# Patient Record
Sex: MALE | Race: WHITE | NOT HISPANIC OR LATINO | Employment: OTHER | URBAN - METROPOLITAN AREA
[De-identification: names, ages, dates, MRNs, and addresses within clinical notes are randomized per-mention and may not be internally consistent; named-entity substitution may affect disease eponyms.]

---

## 2018-08-29 ENCOUNTER — OFFICE VISIT (OUTPATIENT)
Dept: FAMILY MEDICINE CLINIC | Facility: CLINIC | Age: 58
End: 2018-08-29

## 2018-08-29 VITALS
RESPIRATION RATE: 20 BRPM | TEMPERATURE: 97.9 F | HEART RATE: 92 BPM | BODY MASS INDEX: 29.26 KG/M2 | DIASTOLIC BLOOD PRESSURE: 70 MMHG | HEIGHT: 74 IN | WEIGHT: 228 LBS | SYSTOLIC BLOOD PRESSURE: 126 MMHG

## 2018-08-29 DIAGNOSIS — M79.671 PAIN OF RIGHT HEEL: Primary | ICD-10-CM

## 2018-08-29 DIAGNOSIS — M79.645 BILATERAL THUMB PAIN: ICD-10-CM

## 2018-08-29 DIAGNOSIS — M79.644 BILATERAL THUMB PAIN: ICD-10-CM

## 2018-08-29 PROCEDURE — 96372 THER/PROPH/DIAG INJ SC/IM: CPT | Performed by: NURSE PRACTITIONER

## 2018-08-29 PROCEDURE — 99202 OFFICE O/P NEW SF 15 MIN: CPT | Performed by: NURSE PRACTITIONER

## 2018-08-29 RX ORDER — ASPIRIN 81 MG/1
81 TABLET ORAL DAILY
COMMUNITY

## 2018-08-29 RX ORDER — DEXAMETHASONE SODIUM PHOSPHATE 4 MG/ML
4 INJECTION, SOLUTION INTRA-ARTICULAR; INTRALESIONAL; INTRAMUSCULAR; INTRAVENOUS; SOFT TISSUE ONCE
Status: COMPLETED | OUTPATIENT
Start: 2018-08-29 | End: 2018-08-29

## 2018-08-29 RX ORDER — ATORVASTATIN CALCIUM 10 MG/1
10 TABLET, FILM COATED ORAL DAILY
COMMUNITY
End: 2018-11-30

## 2018-08-29 RX ORDER — ESCITALOPRAM OXALATE 10 MG/1
10 TABLET ORAL DAILY
COMMUNITY
End: 2018-10-04 | Stop reason: DRUGHIGH

## 2018-08-29 RX ADMIN — DEXAMETHASONE SODIUM PHOSPHATE 4 MG: 4 INJECTION, SOLUTION INTRA-ARTICULAR; INTRALESIONAL; INTRAMUSCULAR; INTRAVENOUS; SOFT TISSUE at 18:40

## 2018-08-29 NOTE — PATIENT INSTRUCTIONS
Foot Pain  Many things can cause foot pain. Some common causes are:  · An injury.  · A sprain.  · Arthritis.  · Blisters.  · Bunions.    Follow these instructions at home:  Pay attention to any changes in your symptoms. Take these actions to help with your discomfort:  · If directed, put ice on the affected area:  ? Put ice in a plastic bag.  ? Place a towel between your skin and the bag.  ? Leave the ice on for 15-20 minutes, 3?4 times a day for 2 days.  · Take over-the-counter and prescription medicines only as told by your health care provider.  · Wear comfortable, supportive shoes that fit you well. Do not wear high heels.  · Do not stand or walk for long periods of time.  · Do not lift a lot of weight. This can put added pressure on your feet.  · Do stretches to relieve foot pain and stiffness as told by your health care provider.  · Rub your foot gently.  · Keep your feet clean and dry.    Contact a health care provider if:  · Your pain does not get better after a few days of self-care.  · Your pain gets worse.  · You cannot stand on your foot.  Get help right away if:  · Your foot is numb or tingling.  · Your foot or toes are swollen.  · Your foot or toes turn white or blue.  · You have warmth and redness along your foot.  This information is not intended to replace advice given to you by your health care provider. Make sure you discuss any questions you have with your health care provider.  Document Released: 01/13/2017 Document Revised: 05/25/2017 Document Reviewed: 01/13/2016  [employer] Interactive Patient Education © 2018 [employer]  Dexamethasone injection  What is this medicine?  DEXAMETHASONE (dex a METH a sone) is a corticosteroid. It is used to treat inflammation of the skin, joints, lungs, and other organs. Common conditions treated include asthma, allergies, and arthritis. It is also used for other conditions, like blood disorders and diseases of the adrenal glands.  This medicine may be used for  other purposes; ask your health care provider or pharmacist if you have questions.  COMMON BRAND NAME(S): Decadron, DoubleDex, Simplist Dexamethasone, Solurex  What should I tell my health care provider before I take this medicine?  They need to know if you have any of these conditions:  -blood clotting problems  -Cushing's syndrome  -diabetes  -glaucoma  -heart problems or disease  -high blood pressure  -infection like herpes, measles, tuberculosis, or chickenpox  -kidney disease  -liver disease  -mental problems  -myasthenia gravis  -osteoporosis  -previous heart attack  -seizures  -stomach, ulcer or intestine disease including colitis and diverticulitis  -thyroid problem  -an unusual or allergic reaction to dexamethasone, corticosteroids, other medicines, lactose, foods, dyes, or preservatives  -pregnant or trying to get pregnant  -breast-feeding  How should I use this medicine?  This medicine is for injection into a muscle, joint, lesion, soft tissue, or vein. It is given by a health care professional in a hospital or clinic setting.  Talk to your pediatrician regarding the use of this medicine in children. Special care may be needed.  Overdosage: If you think you have taken too much of this medicine contact a poison control center or emergency room at once.  NOTE: This medicine is only for you. Do not share this medicine with others.  What if I miss a dose?  This may not apply. If you are having a series of injections over a prolonged period, try not to miss an appointment. Call your doctor or health care professional to reschedule if you are unable to keep an appointment.  What may interact with this medicine?  Do not take this medicine with any of the following medications:  -mifepristone, RU-486  -vaccines  This medicine may also interact with the following medications:  -amphotericin B  -antibiotics like clarithromycin, erythromycin, and troleandomycin  -aspirin and aspirin-like drugs  -barbiturates like  phenobarbital  -carbamazepine  -cholestyramine  -cholinesterase inhibitors like donepezil, galantamine, rivastigmine, and tacrine  -cyclosporine  -digoxin  -diuretics  -ephedrine  -female hormones, like estrogens or progestins and birth control pills  -indinavir  -isoniazid  -ketoconazole  -medicines for diabetes  -medicines that improve muscle tone or strength for conditions like myasthenia gravis  -NSAIDs, medicines for pain and inflammation, like ibuprofen or naproxen  -phenytoin  -rifampin  -thalidomide  -warfarin  This list may not describe all possible interactions. Give your health care provider a list of all the medicines, herbs, non-prescription drugs, or dietary supplements you use. Also tell them if you smoke, drink alcohol, or use illegal drugs. Some items may interact with your medicine.  What should I watch for while using this medicine?  Your condition will be monitored carefully while you are receiving this medicine.  If you are taking this medicine for a long time, carry an identification card with your name and address, the type and dose of your medicine, and your doctor's name and address.  This medicine may increase your risk of getting an infection. Stay away from people who are sick. Tell your doctor or health care professional if you are around anyone with measles or chickenpox. Talk to your health care provider before you get any vaccines that you take this medicine.  If you are going to have surgery, tell your doctor or health care professional that you have taken this medicine within the last twelve months.  Ask your doctor or health care professional about your diet. You may need to lower the amount of salt you eat.  The medicine can increase your blood sugar. If you are a diabetic check with your doctor if you need help adjusting the dose of your diabetic medicine.  What side effects may I notice from receiving this medicine?  Side effects that you should report to your doctor or health  care professional as soon as possible:  -allergic reactions like skin rash, itching or hives, swelling of the face, lips, or tongue  -black or tarry stools  -change in the amount of urine  -changes in vision  -confusion, excitement, restlessness, a false sense of well-being  -fever, sore throat, sneezing, cough, or other signs of infection, wounds that will not heal  -hallucinations  -increased thirst  -mental depression, mood swings, mistaken feelings of self importance or of being mistreated  -pain in hips, back, ribs, arms, shoulders, or legs  -pain, redness, or irritation at the injection site  -redness, blistering, peeling or loosening of the skin, including inside the mouth  -rounding out of face  -swelling of feet or lower legs  -unusual bleeding or bruising  -unusual tired or weak  -wounds that do not heal  Side effects that usually do not require medical attention (report to your doctor or health care professional if they continue or are bothersome):  -diarrhea or constipation  -change in taste  -headache  -nausea, vomiting  -skin problems, acne, thin and shiny skin  -touble sleeping  -unusual growth of hair on the face or body  -weight gain  This list may not describe all possible side effects. Call your doctor for medical advice about side effects. You may report side effects to FDA at 2-433-FDA-9839.  Where should I keep my medicine?  This drug is given in a hospital or clinic and will not be stored at home.  NOTE: This sheet is a summary. It may not cover all possible information. If you have questions about this medicine, talk to your doctor, pharmacist, or health care provider.  © 2018 [employer] Standard (2009-04-09 14:04:12)    Hand Pain  Many things can cause hand pain. Some common causes are:  · An injury.  · Repeating the same movement with your hand over and over (overuse).  · Osteoporosis.  · Arthritis.  · Lumps in the tendons or joints of the hand and wrist (ganglion  cysts).  · Infection.    Follow these instructions at home:  Pay attention to any changes in your symptoms. Take these actions to help with your discomfort:  · If directed, put ice on the affected area:  ? Put ice in a plastic bag.  ? Place a towel between your skin and the bag.  ? Leave the ice on for 15-20 minutes, 3?4 times a day for 2 days.  · Take over-the-counter and prescription medicines only as told by your health care provider.  · Minimize stress on your hands and wrists as much as possible.  · Take breaks from repetitive activity often.  · Do stretches as told by your health care provider.  · Do not do activities that make your pain worse.    Contact a health care provider if:  · Your pain does not get better after a few days of self-care.  · Your pain gets worse.  · Your pain affects your ability to do your daily activities.  Get help right away if:  · Your hand becomes warm, red, or swollen.  · Your hand is numb or tingling.  · Your hand is extremely swollen or deformed.  · Your hand or fingers turn white or blue.  · You cannot move your hand, wrist, or fingers.  This information is not intended to replace advice given to you by your health care provider. Make sure you discuss any questions you have with your health care provider.  Document Released: 01/13/2017 Document Revised: 05/25/2017 Document Reviewed: 01/13/2016  [employer] Interactive Patient Education © 2018 [employer]  Diclofenac skin gel  What is this medicine?  DICLOFENAC (dye KLOE fen ak) is a non-steroidal anti-inflammatory drug (NSAID). The 1% skin gel is used to treat osteoarthritis of the hands or knees. The 3% skin gel is used to treat actinic keratosis.  This medicine may be used for other purposes; ask your health care provider or pharmacist if you have questions.  COMMON BRAND NAME(S): DSG Enoc, Solaraze, Voltaren Gel  What should I tell my health care provider before I take this medicine?  They need to know if you have any of these  conditions:  -asthma  -bleeding problems  -coronary artery bypass graft (CABG) surgery within the past 2 weeks  -heart disease  -high blood pressure  -if you frequently drink alcohol containing drinks  -kidney disease  -liver disease  -open or infected skin  -stomach problems  -an unusual or allergic reaction to diclofenac, aspirin, other NSAIDs, other medicines, benzyl alcohol (3% gel only), foods, dyes, or preservatives  -pregnant or trying to get pregnant  -breast-feeding  How should I use this medicine?  This medicine is for external use only. Follow the directions on the prescription label. Wash hands before and after use. Do not get this medicine in your eyes. If you do, rinse out with plenty of cool tap water. Use your doses at regular intervals. Do not use your medicine more often than directed.  A special MedGuide will be given to you by the pharmacist with each prescription and refill of the 1% gel. Be sure to read this information carefully each time.  Talk to your pediatrician regarding the use of this medicine in children. Special care may be needed. The 3% gel is not approved for use in children.  Overdosage: If you think you have taken too much of this medicine contact a poison control center or emergency room at once.  NOTE: This medicine is only for you. Do not share this medicine with others.  What if I miss a dose?  If you miss a dose, use it as soon as you can. If it is almost time for your next dose, use only that dose. Do not use double or extra doses.  What may interact with this medicine?  -aspirin  -NSAIDs, medicines for pain and inflammation, like ibuprofen or naproxen  Do not use any other skin products without telling your doctor or health care professional.  This list may not describe all possible interactions. Give your health care provider a list of all the medicines, herbs, non-prescription drugs, or dietary supplements you use. Also tell them if you smoke, drink alcohol, or use  illegal drugs. Some items may interact with your medicine.  What should I watch for while using this medicine?  Tell your doctor or healthcare professional if your symptoms do not start to get better or if they get worse. You will need to follow up with your health care provider to monitor your progress. You may need to be treated for up to 3 months if you are using the 3% gel, but the full effect may not occur until 1 month after stopping treatment. If you develop a severe skin reaction, contact your doctor or health care professional immediately.  This medicine can make you more sensitive to the sun. Keep out of the sun. If you cannot avoid being in the sun, wear protective clothing and use sunscreen. Do not use sun lamps or tanning beds/booths.  Do not take medicines such as ibuprofen and naproxen with this medicine. Side effects such as stomach upset, nausea, or ulcers may be more likely to occur. Many medicines available without a prescription should not be taken with this medicine.  This medicine does not prevent heart attack or stroke. In fact, this medicine may increase the chance of a heart attack or stroke. The chance may increase with longer use of this medicine and in people who have heart disease. If you take aspirin to prevent heart attack or stroke, talk with your doctor or health care professional.  This medicine can cause ulcers and bleeding in the stomach and intestines at any time during treatment. Do not smoke cigarettes or drink alcohol. These increase irritation to your stomach and can make it more susceptible to damage from this medicine. Ulcers and bleeding can happen without warning symptoms and can cause death.  You may get drowsy or dizzy. Do not drive, use machinery, or do anything that needs mental alertness until you know how this medicine affects you. Do not stand or sit up quickly, especially if you are an older patient. This reduces the risk of dizzy or fainting spells.  This  medicine can cause you to bleed more easily. Try to avoid damage to your teeth and gums when you brush or floss your teeth.  What side effects may I notice from receiving this medicine?  Side effects that you should report to your doctor or health care professional as soon as possible:  -allergic reactions like skin rash, itching or hives, swelling of the face, lips, or tongue  -black or bloody stools, blood in the urine or vomit  -blurred vision  -chest pain  -difficulty breathing or wheezing  -nausea or vomiting  -redness, blistering, peeling or loosening of the skin, including inside the mouth  -slurred speech or weakness on one side of the body  -trouble passing urine or change in the amount of urine  -unexplained weight gain or swelling  -unusually weak or tired  -yellowing of eyes or skin  Side effects that usually do not require medical attention (report to your doctor or health care professional if they continue or are bothersome):  -dizziness  -dry skin  -headache  -heartburn  -increased sensitivity to the sun  -stomach pain  -tingling at the application site  This list may not describe all possible side effects. Call your doctor for medical advice about side effects. You may report side effects to FDA at 7-730-FDA-5739.  Where should I keep my medicine?  Keep out of the reach of children.  Store the 1% gel at room temperature between 15 and 30 degrees C (59 and 86 degrees F). Store the 3% gel at room temperature between 20 and 25 degrees C (68 and 77 degrees F). Protect from light. Throw away any unused medicine after the expiration date.  NOTE: This sheet is a summary. It may not cover all possible information. If you have questions about this medicine, talk to your doctor, pharmacist, or health care provider.  © 2018 [employer] (2009-04-20 16:35:07)

## 2018-08-30 NOTE — PROGRESS NOTES
Subjective   Anselmo Calderón is a 58 y.o. male.   Mr. Calderón presents today with c/o bilateral thumb pain and right heel pain. He has recently relocated to [place] from [place]-he has an upcoming appointment with LINA Ruiz on 10/24/18 to establish care with a new PCP.    History of Present Illness    The following portions of the patient's history were reviewed and updated as appropriate: allergies, current medications, past family history, past medical history, past social history, past surgical history and problem list.     No Known Allergies     Current Outpatient Prescriptions:   •  aspirin 81 MG EC tablet, Take 81 mg by mouth Daily., Disp: , Rfl:   •  atorvastatin (LIPITOR) 10 MG tablet, Take 10 mg by mouth Daily., Disp: , Rfl:   •  escitalopram (LEXAPRO) 10 MG tablet, Take 10 mg by mouth Daily., Disp: , Rfl:   •  metFORMIN (GLUCOPHAGE) 850 MG tablet, Take 850 mg by mouth 2 (Two) Times a Day With Meals., Disp: , Rfl:   •  Multiple Vitamins-Minerals (MULTIVITAMIN ADULT PO), Take  by mouth., Disp: , Rfl:   •  SITagliptin (JANUVIA) 100 MG tablet, Take 100 mg by mouth Daily., Disp: , Rfl:   •  diclofenac (VOLTAREN) 1 % gel gel, Apply 4 g topically to the appropriate area as directed 3 (Three) Times a Day., Disp: 100 g, Rfl: 0  No current facility-administered medications for this visit.      Review of Systems   Constitutional: Negative for activity change, appetite change, chills, diaphoresis, fatigue, fever and unexpected weight change.   Respiratory: Negative.    Cardiovascular: Negative.    Gastrointestinal: Negative.    Musculoskeletal: Positive for arthralgias (bilateral thumb pain, right heel pain).        Patient states has a history of right ankle injury-has had five surgeries.   Skin: Negative.    Neurological: Negative.        Objective   Physical Exam   Constitutional: He is oriented to person, place, and time. Vital signs are normal. He appears well-developed and well-nourished. He is  cooperative. He does not appear ill. No distress.   HENT:   Mouth/Throat: Uvula is midline and mucous membranes are normal.   Neck: Normal range of motion. Neck supple. No thyromegaly present.   Cardiovascular: Normal rate, regular rhythm and normal heart sounds.    Pulmonary/Chest: Effort normal and breath sounds normal.   Musculoskeletal:        Right ankle: He exhibits decreased range of motion. Tenderness. Achilles tendon exhibits pain.        Left ankle: Normal.        Right hand: He exhibits tenderness (thumb). He exhibits normal range of motion and normal capillary refill.        Left hand: He exhibits tenderness (thumb). He exhibits normal range of motion and normal capillary refill.   Patient reports decreased grasping strength both hands.    Lymphadenopathy:     He has no cervical adenopathy.   Neurological: He is alert and oriented to person, place, and time.   Skin: Skin is warm, dry and intact. No rash noted. He is not diaphoretic.   Psychiatric: He has a normal mood and affect. His behavior is normal. Judgment and thought content normal.   Vitals reviewed.    Vitals:    08/29/18 1739   BP: 126/70   Pulse: 92   Resp: 20   Temp: 97.9 °F (36.6 °C)   Body mass index is 29.27 kg/m².     Assessment/Plan   Anselmo was seen today for foot pain and hand pain.    Diagnoses and all orders for this visit:    Pain of right heel  -     diclofenac (VOLTAREN) 1 % gel gel; Apply 4 g topically to the appropriate area as directed 3 (Three) Times a Day.  -     dexamethasone (DECADRON) injection 4 mg; Inject 1 mL into the appropriate muscle as directed by prescriber 1 (One) Time.  -     Ambulatory Referral to Podiatry    Bilateral thumb pain  -     diclofenac (VOLTAREN) 1 % gel gel; Apply 4 g topically to the appropriate area as directed 3 (Three) Times a Day.  -     dexamethasone (DECADRON) injection 4 mg; Inject 1 mL into the appropriate muscle as directed by prescriber 1 (One) Time.       Discussed the nature of the  medical condition(s) risks, complications, implications, management, safe and proper use of medications. Encouraged medication compliance, and keeping scheduled follow up appointments with me and any other providers.

## 2018-10-04 ENCOUNTER — OFFICE VISIT (OUTPATIENT)
Dept: FAMILY MEDICINE CLINIC | Facility: CLINIC | Age: 58
End: 2018-10-04

## 2018-10-04 VITALS
HEIGHT: 74 IN | OXYGEN SATURATION: 96 % | SYSTOLIC BLOOD PRESSURE: 148 MMHG | RESPIRATION RATE: 16 BRPM | DIASTOLIC BLOOD PRESSURE: 70 MMHG | TEMPERATURE: 96.7 F | WEIGHT: 237.2 LBS | BODY MASS INDEX: 30.44 KG/M2 | HEART RATE: 76 BPM

## 2018-10-04 DIAGNOSIS — Z23 IMMUNIZATION DUE: ICD-10-CM

## 2018-10-04 DIAGNOSIS — Z00.00 HEALTH CARE MAINTENANCE: ICD-10-CM

## 2018-10-04 DIAGNOSIS — Z11.59 NEED FOR HEPATITIS C SCREENING TEST: ICD-10-CM

## 2018-10-04 DIAGNOSIS — E78.5 HYPERLIPIDEMIA, UNSPECIFIED HYPERLIPIDEMIA TYPE: ICD-10-CM

## 2018-10-04 DIAGNOSIS — N20.0 RENAL STONE: ICD-10-CM

## 2018-10-04 DIAGNOSIS — E11.9 CONTROLLED TYPE 2 DIABETES MELLITUS WITHOUT COMPLICATION, WITHOUT LONG-TERM CURRENT USE OF INSULIN (HCC): ICD-10-CM

## 2018-10-04 DIAGNOSIS — I86.1 RIGHT VARICOCELE: ICD-10-CM

## 2018-10-04 DIAGNOSIS — K21.00 GASTROESOPHAGEAL REFLUX DISEASE WITH ESOPHAGITIS: ICD-10-CM

## 2018-10-04 DIAGNOSIS — M19.071 DEGENERATIVE JOINT DISEASE OF ANKLE AND FOOT, RIGHT: ICD-10-CM

## 2018-10-04 DIAGNOSIS — M70.70 BURSITIS OF HIP, UNSPECIFIED BURSA, UNSPECIFIED LATERALITY: ICD-10-CM

## 2018-10-04 DIAGNOSIS — F32.9 MAJOR DEPRESSIVE DISORDER WITH SINGLE EPISODE, REMISSION STATUS UNSPECIFIED: Primary | ICD-10-CM

## 2018-10-04 DIAGNOSIS — R20.2 PARESTHESIA: ICD-10-CM

## 2018-10-04 DIAGNOSIS — R06.83 SNORING: ICD-10-CM

## 2018-10-04 DIAGNOSIS — J34.2 DEVIATED NASAL SEPTUM: ICD-10-CM

## 2018-10-04 PROBLEM — I25.2 MYOCARDIAL INFARCT, OLD: Status: ACTIVE | Noted: 2018-10-04

## 2018-10-04 LAB
25(OH)D3 SERPL-MCNC: 28.6 NG/ML
ALBUMIN SERPL-MCNC: 4.69 G/DL (ref 3.2–4.8)
ALBUMIN/GLOB SERPL: 2.2 G/DL (ref 1.5–2.5)
ALP SERPL-CCNC: 61 U/L (ref 25–100)
ALT SERPL W P-5'-P-CCNC: 29 U/L (ref 7–40)
ANION GAP SERPL CALCULATED.3IONS-SCNC: 10 MMOL/L (ref 3–11)
ARTICHOKE IGE QN: 128 MG/DL (ref 0–130)
AST SERPL-CCNC: 22 U/L (ref 0–33)
BASOPHILS # BLD AUTO: 0.05 10*3/MM3 (ref 0–0.2)
BASOPHILS NFR BLD AUTO: 0.6 % (ref 0–1)
BILIRUB SERPL-MCNC: 0.6 MG/DL (ref 0.3–1.2)
BUN BLD-MCNC: 16 MG/DL (ref 9–23)
BUN/CREAT SERPL: 16.7 (ref 7–25)
CALCIUM SPEC-SCNC: 9.5 MG/DL (ref 8.7–10.4)
CHLORIDE SERPL-SCNC: 97 MMOL/L (ref 99–109)
CHOLEST SERPL-MCNC: 187 MG/DL (ref 0–200)
CO2 SERPL-SCNC: 29 MMOL/L (ref 20–31)
CREAT BLD-MCNC: 0.96 MG/DL (ref 0.6–1.3)
DEPRECATED RDW RBC AUTO: 44.3 FL (ref 37–54)
EOSINOPHIL # BLD AUTO: 0.14 10*3/MM3 (ref 0–0.3)
EOSINOPHIL NFR BLD AUTO: 1.6 % (ref 0–3)
ERYTHROCYTE [DISTWIDTH] IN BLOOD BY AUTOMATED COUNT: 13.3 % (ref 11.3–14.5)
GFR SERPL CREATININE-BSD FRML MDRD: 81 ML/MIN/1.73
GLOBULIN UR ELPH-MCNC: 2.1 GM/DL
GLUCOSE BLD-MCNC: 146 MG/DL (ref 70–100)
HAV IGM SERPL QL IA: NORMAL
HBV CORE IGM SERPL QL IA: NORMAL
HBV SURFACE AG SERPL QL IA: NORMAL
HCT VFR BLD AUTO: 46.9 % (ref 38.9–50.9)
HCV AB SER DONR QL: NORMAL
HDLC SERPL-MCNC: 50 MG/DL (ref 40–60)
HGB BLD-MCNC: 15.3 G/DL (ref 13.1–17.5)
IMM GRANULOCYTES # BLD: 0.09 10*3/MM3 (ref 0–0.03)
IMM GRANULOCYTES NFR BLD: 1 % (ref 0–0.6)
LYMPHOCYTES # BLD AUTO: 3.23 10*3/MM3 (ref 0.6–4.8)
LYMPHOCYTES NFR BLD AUTO: 36.7 % (ref 24–44)
MCH RBC QN AUTO: 29.8 PG (ref 27–31)
MCHC RBC AUTO-ENTMCNC: 32.6 G/DL (ref 32–36)
MCV RBC AUTO: 91.2 FL (ref 80–99)
MONOCYTES # BLD AUTO: 0.7 10*3/MM3 (ref 0–1)
MONOCYTES NFR BLD AUTO: 7.9 % (ref 0–12)
NEUTROPHILS # BLD AUTO: 4.69 10*3/MM3 (ref 1.5–8.3)
NEUTROPHILS NFR BLD AUTO: 53.2 % (ref 41–71)
PLATELET # BLD AUTO: 344 10*3/MM3 (ref 150–450)
PMV BLD AUTO: 10.4 FL (ref 6–12)
POC CREATININE URINE: 200
POC MICROALBUMIN URINE: 30
POTASSIUM BLD-SCNC: 4.5 MMOL/L (ref 3.5–5.5)
PROT SERPL-MCNC: 6.8 G/DL (ref 5.7–8.2)
RBC # BLD AUTO: 5.14 10*6/MM3 (ref 4.2–5.76)
SODIUM BLD-SCNC: 136 MMOL/L (ref 132–146)
T4 FREE SERPL-MCNC: 1.22 NG/DL (ref 0.89–1.76)
TRIGL SERPL-MCNC: 165 MG/DL (ref 0–150)
TSH SERPL DL<=0.05 MIU/L-ACNC: 0.89 MIU/ML (ref 0.35–5.35)
WBC NRBC COR # BLD: 8.81 10*3/MM3 (ref 3.5–10.8)

## 2018-10-04 PROCEDURE — 85025 COMPLETE CBC W/AUTO DIFF WBC: CPT | Performed by: NURSE PRACTITIONER

## 2018-10-04 PROCEDURE — 80061 LIPID PANEL: CPT | Performed by: NURSE PRACTITIONER

## 2018-10-04 PROCEDURE — 80074 ACUTE HEPATITIS PANEL: CPT | Performed by: NURSE PRACTITIONER

## 2018-10-04 PROCEDURE — 99214 OFFICE O/P EST MOD 30 MIN: CPT | Performed by: NURSE PRACTITIONER

## 2018-10-04 PROCEDURE — 36415 COLL VENOUS BLD VENIPUNCTURE: CPT | Performed by: NURSE PRACTITIONER

## 2018-10-04 PROCEDURE — 82306 VITAMIN D 25 HYDROXY: CPT | Performed by: NURSE PRACTITIONER

## 2018-10-04 PROCEDURE — 90686 IIV4 VACC NO PRSV 0.5 ML IM: CPT | Performed by: NURSE PRACTITIONER

## 2018-10-04 PROCEDURE — 90472 IMMUNIZATION ADMIN EACH ADD: CPT | Performed by: NURSE PRACTITIONER

## 2018-10-04 PROCEDURE — 84439 ASSAY OF FREE THYROXINE: CPT | Performed by: NURSE PRACTITIONER

## 2018-10-04 PROCEDURE — 82044 UR ALBUMIN SEMIQUANTITATIVE: CPT | Performed by: NURSE PRACTITIONER

## 2018-10-04 PROCEDURE — 90471 IMMUNIZATION ADMIN: CPT | Performed by: NURSE PRACTITIONER

## 2018-10-04 PROCEDURE — 90732 PPSV23 VACC 2 YRS+ SUBQ/IM: CPT | Performed by: NURSE PRACTITIONER

## 2018-10-04 PROCEDURE — 84443 ASSAY THYROID STIM HORMONE: CPT | Performed by: NURSE PRACTITIONER

## 2018-10-04 PROCEDURE — 80053 COMPREHEN METABOLIC PANEL: CPT | Performed by: NURSE PRACTITIONER

## 2018-10-04 PROCEDURE — 83036 HEMOGLOBIN GLYCOSYLATED A1C: CPT | Performed by: NURSE PRACTITIONER

## 2018-10-04 RX ORDER — TADALAFIL 5 MG/1
5 TABLET ORAL DAILY PRN
COMMUNITY

## 2018-10-04 RX ORDER — ESCITALOPRAM OXALATE 20 MG/1
20 TABLET ORAL DAILY
Qty: 90 TABLET | Refills: 1 | Status: SHIPPED | OUTPATIENT
Start: 2018-10-04 | End: 2019-03-19 | Stop reason: SDUPTHER

## 2018-10-04 NOTE — PROGRESS NOTES
Subjective   Anselmo Calderón is a 58 y.o. male.     History of Present Illness Here to establish care. Vague with some information of his history. Moved frequently has a child. Considers [place] his home. Spend some time in [place] but would not say why he was there. Has [employer] ins and wants to est care here. Living with his aunt and uncle. Retired .    Has multiple medical problems.  1. On lexapro for depression. Has fatigue and lack of motivation. On Lexapro for 18 months. It helps. Irritability seems to be a problem. No suicidal thoughts. Does not like to treat problems with medications. Is interested in therapy.  2. Has ED. Takes Cialis prn. Currently not sexually active.  3. Has had multiple hernia surgs. Now complaining of pain in right testicle. Has a varicocele. Requests referral to urology for surgical repair.  4. Snoring lately. This is new issue. Gasping for breath upon awakening. Has deviated septum. Would like referral to ENT for evaluation and then possible referral to sleep med.  5. Diagnosed with bursitis in left hip. Injection was recommended but he declined.  6. Long history of numbness right thigh. Nerve test-EMG. Did not get results due to lack of follow up.   7. Has a history of GERD and schiatizki's ring. Also had colonoscopy.      The following portions of the patient's history were reviewed and updated as appropriate: allergies, current medications, past family history, past medical history, past social history, past surgical history and problem list.    Review of Systems    Objective   Physical Exam   Constitutional: He is oriented to person, place, and time. He appears well-developed and well-nourished. No distress.   HENT:   Head: Normocephalic and atraumatic.   Right Ear: Tympanic membrane and external ear normal.   Left Ear: Tympanic membrane and external ear normal.   Nose: Nose normal.   Mouth/Throat: Oropharynx is clear and moist. No oropharyngeal exudate.   Eyes:  Pupils are equal, round, and reactive to light. Conjunctivae are normal. Right eye exhibits no discharge. Left eye exhibits no discharge. No scleral icterus.   Neck: Neck supple. No tracheal deviation present. No thyromegaly present.   Cardiovascular: Normal rate, regular rhythm and normal heart sounds.  Exam reveals no gallop and no friction rub.    No murmur heard.  Pulmonary/Chest: Effort normal and breath sounds normal. No respiratory distress. He has no wheezes.   Abdominal: Soft. Bowel sounds are normal. He exhibits no distension and no mass. There is no tenderness.   Musculoskeletal: He exhibits no edema or deformity.   Boot RLE   Lymphadenopathy:     He has no cervical adenopathy.   Neurological: He is alert and oriented to person, place, and time. Coordination normal.   Skin: Skin is warm and dry. Capillary refill takes less than 2 seconds. No rash noted. No erythema.   Psychiatric: He has a normal mood and affect. His speech is normal and behavior is normal. Judgment and thought content normal.   Nursing note and vitals reviewed.        Assessment/Plan   Anselmo was seen today for establish care, hyperlipidemia and diabetes.    Diagnoses and all orders for this visit:    Major depressive disorder with single episode, remission status unspecified  -     escitalopram (LEXAPRO) 20 MG tablet; Take 1 tablet by mouth Daily.  -     Ambulatory Referral to Psychology    Right varicocele  -     Ambulatory Referral to Urology    Snoring  -     Ambulatory Referral to ENT (Otolaryngology)    Deviated nasal septum  -     Ambulatory Referral to ENT (Otolaryngology)    Hyperlipidemia, unspecified hyperlipidemia type  -     Lipid Panel    Controlled type 2 diabetes mellitus without complication, without long-term current use of insulin (CMS/[facility])  -     POC Glycosylated Hemoglobin (Hb A1C)  -     POC Microalbumin  -     CBC & Differential  -     Comprehensive Metabolic Panel  -     T4, Free  -     TSH  -     CBC Auto  Differential    Degenerative joint disease of ankle and foot, right    Renal stone    Need for hepatitis C screening test  -     Hepatitis Panel, Acute    Health care maintenance  -     Vitamin D 25 Hydroxy    Immunization due  -     Fluarix/Fluzone/Afluria Quad>6 Months  -     Pneumococcal Polysaccharide Vaccine 23-Valent Greater Than or Equal To 1yo Subcutaneous / IM    Gastroesophageal reflux disease with esophagitis    Bursitis of hip, unspecified bursa, unspecified laterality    Paresthesia      Discuss extended office hours and appropriate use of the ER. Discussed no controlled substances prescribed from this office. Appropriate referrals will be made to pain management and psychiatry if needed. Stressed the importance and expectation of medical compliance with plan of care, medications, and follow up appointments.  Discussed we will address his problems 3 at a time.  Schedule CPX.  Fasting labs today.  Obtain previous medical records.  Appropriate referrals made.  Discussed the nature of the disease including, risks, complications, implications, management, safe and proper use of medications. Encouraged therapeutic lifestyle changes including low calorie diet with plenty of fruits and vegetables, daily exercise, medication compliance, and keeping scheduled follow up appointments with me and any other providers. Encouraged patient to have appointment for complete physical, fasting labs, appropriate screenings, and immunizations on an annual basis.  VIS provided.

## 2018-10-05 DIAGNOSIS — E11.9 CONTROLLED TYPE 2 DIABETES MELLITUS WITHOUT COMPLICATION, WITHOUT LONG-TERM CURRENT USE OF INSULIN (HCC): Primary | ICD-10-CM

## 2018-10-05 LAB — HBA1C MFR BLD: 7.6 % (ref 4.8–5.6)

## 2018-11-27 ENCOUNTER — HOSPITAL ENCOUNTER (OUTPATIENT)
Dept: GENERAL RADIOLOGY | Facility: HOSPITAL | Age: 58
Discharge: HOME | End: 2018-11-27
Admitting: NURSE PRACTITIONER

## 2018-11-27 ENCOUNTER — OFFICE VISIT (OUTPATIENT)
Dept: FAMILY MEDICINE CLINIC | Facility: CLINIC | Age: 58
End: 2018-11-27

## 2018-11-27 VITALS
BODY MASS INDEX: 30.94 KG/M2 | DIASTOLIC BLOOD PRESSURE: 78 MMHG | HEIGHT: 74 IN | RESPIRATION RATE: 18 BRPM | SYSTOLIC BLOOD PRESSURE: 126 MMHG | OXYGEN SATURATION: 95 % | HEART RATE: 84 BPM | TEMPERATURE: 97.3 F | WEIGHT: 241.13 LBS

## 2018-11-27 DIAGNOSIS — I25.2 MYOCARDIAL INFARCT, OLD: ICD-10-CM

## 2018-11-27 DIAGNOSIS — E11.9 CONTROLLED TYPE 2 DIABETES MELLITUS WITHOUT COMPLICATION, WITHOUT LONG-TERM CURRENT USE OF INSULIN (HCC): ICD-10-CM

## 2018-11-27 DIAGNOSIS — Z01.811 PRE-OP CHEST EXAM: ICD-10-CM

## 2018-11-27 DIAGNOSIS — R94.31 ABNORMAL EKG: ICD-10-CM

## 2018-11-27 DIAGNOSIS — Z01.818 PRE-OP EXAM: Primary | ICD-10-CM

## 2018-11-27 LAB
ALBUMIN SERPL-MCNC: 4.53 G/DL (ref 3.2–4.8)
ALBUMIN/GLOB SERPL: 2.2 G/DL (ref 1.5–2.5)
ALP SERPL-CCNC: 63 U/L (ref 25–100)
ALT SERPL W P-5'-P-CCNC: 25 U/L (ref 7–40)
ANION GAP SERPL CALCULATED.3IONS-SCNC: 6 MMOL/L (ref 3–11)
AST SERPL-CCNC: 20 U/L (ref 0–33)
BASOPHILS # BLD AUTO: 0.03 10*3/MM3 (ref 0–0.2)
BASOPHILS NFR BLD AUTO: 0.3 % (ref 0–1)
BILIRUB SERPL-MCNC: 0.4 MG/DL (ref 0.3–1.2)
BUN BLD-MCNC: 21 MG/DL (ref 9–23)
BUN/CREAT SERPL: 20.6 (ref 7–25)
CALCIUM SPEC-SCNC: 9.1 MG/DL (ref 8.7–10.4)
CHLORIDE SERPL-SCNC: 103 MMOL/L (ref 99–109)
CO2 SERPL-SCNC: 26 MMOL/L (ref 20–31)
CREAT BLD-MCNC: 1.02 MG/DL (ref 0.6–1.3)
DEPRECATED RDW RBC AUTO: 46.4 FL (ref 37–54)
EOSINOPHIL # BLD AUTO: 0.19 10*3/MM3 (ref 0–0.3)
EOSINOPHIL NFR BLD AUTO: 1.9 % (ref 0–3)
ERYTHROCYTE [DISTWIDTH] IN BLOOD BY AUTOMATED COUNT: 13.5 % (ref 11.3–14.5)
GFR SERPL CREATININE-BSD FRML MDRD: 75 ML/MIN/1.73
GLOBULIN UR ELPH-MCNC: 2.1 GM/DL
GLUCOSE BLD-MCNC: 184 MG/DL (ref 70–100)
HCT VFR BLD AUTO: 46.2 % (ref 38.9–50.9)
HGB BLD-MCNC: 14.8 G/DL (ref 13.1–17.5)
IMM GRANULOCYTES # BLD: 0.11 10*3/MM3 (ref 0–0.03)
IMM GRANULOCYTES NFR BLD: 1.1 % (ref 0–0.6)
LYMPHOCYTES # BLD AUTO: 3.46 10*3/MM3 (ref 0.6–4.8)
LYMPHOCYTES NFR BLD AUTO: 35 % (ref 24–44)
MCH RBC QN AUTO: 30 PG (ref 27–31)
MCHC RBC AUTO-ENTMCNC: 32 G/DL (ref 32–36)
MCV RBC AUTO: 93.5 FL (ref 80–99)
MONOCYTES # BLD AUTO: 1.04 10*3/MM3 (ref 0–1)
MONOCYTES NFR BLD AUTO: 10.5 % (ref 0–12)
NEUTROPHILS # BLD AUTO: 5.17 10*3/MM3 (ref 1.5–8.3)
NEUTROPHILS NFR BLD AUTO: 52.3 % (ref 41–71)
PLATELET # BLD AUTO: 369 10*3/MM3 (ref 150–450)
PMV BLD AUTO: 10.4 FL (ref 6–12)
POTASSIUM BLD-SCNC: 4.5 MMOL/L (ref 3.5–5.5)
PROT SERPL-MCNC: 6.6 G/DL (ref 5.7–8.2)
RBC # BLD AUTO: 4.94 10*6/MM3 (ref 4.2–5.76)
SODIUM BLD-SCNC: 135 MMOL/L (ref 132–146)
WBC NRBC COR # BLD: 9.89 10*3/MM3 (ref 3.5–10.8)

## 2018-11-27 PROCEDURE — 71046 X-RAY EXAM CHEST 2 VIEWS: CPT

## 2018-11-27 PROCEDURE — 80053 COMPREHEN METABOLIC PANEL: CPT | Performed by: NURSE PRACTITIONER

## 2018-11-27 PROCEDURE — 85025 COMPLETE CBC W/AUTO DIFF WBC: CPT | Performed by: NURSE PRACTITIONER

## 2018-11-27 PROCEDURE — 36415 COLL VENOUS BLD VENIPUNCTURE: CPT | Performed by: NURSE PRACTITIONER

## 2018-11-27 PROCEDURE — 99214 OFFICE O/P EST MOD 30 MIN: CPT | Performed by: NURSE PRACTITIONER

## 2018-11-27 NOTE — PROGRESS NOTES
Subjective   Anselmo Calderón is a 58 y.o. male who presents to the office today for a preoperative consultation at the request of surgeon Dr Vaz who plans on performing plasma injection right foot for plantar fascitis on December 6. This consultation is requested for the specific conditions prompting preoperative evaluation (i.e. because of potential affect on operative risk): due to history. Planned anesthesia: IV sedation. The patient has the following known anesthesia issues: patient is unsure if he has ever been intubated. Thinks he only had spinals for ankle surgery and hernia repair.. Patients bleeding risk: on Asa 81. Patient does not have objections to receiving blood products if needed.  He  has a past medical history of Degenerative joint disease of ankle and foot, right, Depression, Diabetes (CMS/[facility]), Hyperlipidemia, Lyme disease (2007), Myocardial infarct, old (10/4/2018), and Renal stone.  He does not have any pertinent problems on file.  He  has a past surgical history that includes Ankle surgery (Right); Inguinal hernia repair (Bilateral); Mouth surgery; and Tonsillectomy.  His family history includes Alcohol abuse in his maternal grandmother; Aneurysm in his paternal grandfather; Brain cancer in his paternal grandmother; Depression in his brother, sister, and son; Diabetes in his sister; Lung cancer in his maternal grandfather; No Known Problems in his daughter, father, and mother; Suicidality in his paternal grandmother.  He  reports that  has never smoked. he has never used smokeless tobacco. He reports that he drinks alcohol. He reports that he does not use drugs.  Current Outpatient Medications   Medication Sig Dispense Refill   • aspirin 81 MG EC tablet Take 81 mg by mouth Daily.     • atorvastatin (LIPITOR) 10 MG tablet Take 10 mg by mouth Daily.     • diclofenac (VOLTAREN) 1 % gel gel Apply 4 g topically to the appropriate area as directed 3 (Three) Times a Day. 100 g 0   • escitalopram  "(LEXAPRO) 20 MG tablet Take 1 tablet by mouth Daily. 90 tablet 1   • metFORMIN (GLUCOPHAGE) 850 MG tablet Take 850 mg by mouth 2 (Two) Times a Day With Meals.     • Multiple Vitamins-Minerals (MULTIVITAMIN ADULT PO) Take  by mouth.     • SITagliptin (JANUVIA) 100 MG tablet Take 100 mg by mouth Daily.     • tadalafil (CIALIS) 5 MG tablet Take 5 mg by mouth Daily As Needed for erectile dysfunction.       No current facility-administered medications for this visit.      Current Outpatient Medications on File Prior to Visit   Medication Sig   • aspirin 81 MG EC tablet Take 81 mg by mouth Daily.   • atorvastatin (LIPITOR) 10 MG tablet Take 10 mg by mouth Daily.   • diclofenac (VOLTAREN) 1 % gel gel Apply 4 g topically to the appropriate area as directed 3 (Three) Times a Day.   • escitalopram (LEXAPRO) 20 MG tablet Take 1 tablet by mouth Daily.   • metFORMIN (GLUCOPHAGE) 850 MG tablet Take 850 mg by mouth 2 (Two) Times a Day With Meals.   • Multiple Vitamins-Minerals (MULTIVITAMIN ADULT PO) Take  by mouth.   • SITagliptin (JANUVIA) 100 MG tablet Take 100 mg by mouth Daily.   • tadalafil (CIALIS) 5 MG tablet Take 5 mg by mouth Daily As Needed for erectile dysfunction.     No current facility-administered medications on file prior to visit.      He has No Known Allergies.    The following portions of the patient's history were reviewed and updated as appropriate: allergies, current medications, past family history, past medical history, past social history, past surgical history and problem list.    Review of Systems  A comprehensive review of systems was negative.    Objective   /78 (BP Location: Right arm, Patient Position: Sitting)   Pulse 84   Temp 97.3 °F (36.3 °C) (Temporal)   Resp 18   Ht 188 cm (74\")   Wt 109 kg (241 lb 2 oz)   SpO2 95%   BMI 30.96 kg/m²     General Appearance:    Alert, cooperative, no distress, appears stated age   Head:    Normocephalic, without obvious abnormality, atraumatic "   Eyes:    PERRL, conjunctiva/corneas clear, EOM's intact, fundi     benign, both eyes        Ears:    Normal TM's and external ear canals, both ears   Nose:   Nares normal, septum midline, mucosa normal, no drainage    or sinus tenderness   Throat:   Lips, mucosa, and tongue normal; teeth and gums normal   Neck:   Supple, symmetrical, trachea midline, no adenopathy;        thyroid:  No enlargement/tenderness/nodules; no carotid    bruit or JVD   Back:     Symmetric, no curvature, ROM normal, no CVA tenderness   Lungs:     Clear to auscultation bilaterally, respirations unlabored   Chest wall:    No tenderness or deformity   Heart:    Regular rate and rhythm, S1 and S2 normal, no murmur, rub   or gallop   Abdomen:     Soft, non-tender, bowel sounds active all four quadrants,     no masses, no organomegaly   Genitalia:    Normal male without lesion, discharge or tenderness   Rectal:    Normal tone, normal prostate, no masses or tenderness;    guaiac negative stool   Extremities:   Extremities normal, atraumatic, no cyanosis or edema   Pulses:   2+ and symmetric all extremities   Skin:   Skin color, texture, turgor normal, no rashes or lesions   Lymph nodes:   Cervical, supraclavicular, and axillary nodes normal   Neurologic:   CNII-XII intact. Normal strength, sensation and reflexes       throughout       Predictors of intubation difficulty:   Morbid obesity? no   Anatomically abnormal facies? no   Prominent incisors? no   Receding mandible? no   Short, thick neck? no   Neck range of motion: normal   Mallampati score: II (hard and soft palate, upper portion of tonsils anduvula visible)     Dentition: one tooth missing on left upper. Has a post    Cardiographics  ECG: rhythm: reg with 1st degree AV block, rate=72 bpm, jw=745 ms, voe=630 ms, da=759 ms, axis=0 degrees    Imaging  Chest x-ray: atelectasis bilaterally     Lab Review   Orders Only on 10/05/2018   Component Date Value   • Hemoglobin A1C 10/04/2018 7.60*    Office Visit on 10/04/2018   Component Date Value   • Microalbumin, Urine 10/04/2018 30    • Creatinine, Urine 10/04/2018 200    • Glucose 10/04/2018 146*   • BUN 10/04/2018 16    • Creatinine 10/04/2018 0.96    • Sodium 10/04/2018 136    • Potassium 10/04/2018 4.5    • Chloride 10/04/2018 97*   • CO2 10/04/2018 29.0    • Calcium 10/04/2018 9.5    • Total Protein 10/04/2018 6.8    • Albumin 10/04/2018 4.69    • ALT (SGPT) 10/04/2018 29    • AST (SGOT) 10/04/2018 22    • Alkaline Phosphatase 10/04/2018 61    • Total Bilirubin 10/04/2018 0.6    • eGFR Non  Amer 10/04/2018 81    • Globulin 10/04/2018 2.1    • A/G Ratio 10/04/2018 2.2    • BUN/Creatinine Ratio 10/04/2018 16.7    • Anion Gap 10/04/2018 10.0    • Total Cholesterol 10/04/2018 187    • Triglycerides 10/04/2018 165*   • HDL Cholesterol 10/04/2018 50    • LDL Cholesterol  10/04/2018 128    • Free T4 10/04/2018 1.22    • TSH 10/04/2018 0.888    • 25 Hydroxy, Vitamin D 10/04/2018 28.6    • Hepatitis B Surface Ag 10/04/2018 Non-Reactive    • Hep A IgM 10/04/2018 Non-Reactive    • Hep B C IgM 10/04/2018 Non-Reactive    • Hepatitis C Ab 10/04/2018 Non-Reactive    • WBC 10/04/2018 8.81    • RBC 10/04/2018 5.14    • Hemoglobin 10/04/2018 15.3    • Hematocrit 10/04/2018 46.9    • MCV 10/04/2018 91.2    • MCH 10/04/2018 29.8    • MCHC 10/04/2018 32.6    • RDW 10/04/2018 13.3    • RDW-SD 10/04/2018 44.3    • MPV 10/04/2018 10.4    • Platelets 10/04/2018 344    • Neutrophil % 10/04/2018 53.2    • Lymphocyte % 10/04/2018 36.7    • Monocyte % 10/04/2018 7.9    • Eosinophil % 10/04/2018 1.6    • Basophil % 10/04/2018 0.6    • Immature Grans % 10/04/2018 1.0*   • Neutrophils, Absolute 10/04/2018 4.69    • Lymphocytes, Absolute 10/04/2018 3.23    • Monocytes, Absolute 10/04/2018 0.70    • Eosinophils, Absolute 10/04/2018 0.14    • Basophils, Absolute 10/04/2018 0.05    • Immature Grans, Absolute 10/04/2018 0.09*       Anselmo was seen today for pre-op  exam.    Diagnoses and all orders for this visit:    Pre-op exam  -     CBC & Differential  -     Comprehensive Metabolic Panel  -     CBC Auto Differential  -     Ambulatory Referral to Cardiology    Pre-op chest exam  -     XR Chest PA & Lateral; Future  -     CBC & Differential  -     Comprehensive Metabolic Panel  -     CBC Auto Differential    Abnormal EKG  -     Ambulatory Referral to Cardiology    Myocardial infarct, old  -     Ambulatory Referral to Cardiology    Controlled type 2 diabetes mellitus without complication, without long-term current use of insulin (CMS/[facility])  -     Ambulatory Referral to Cardiology        Difficulty with intubation is not anticipated.    Cardiac Risk Estimation: will need Cardiology clearance due to history of MI and intraventricular conduction delay on EKG without comparison.    Patient seen by Cardiology on 11/30/18 and cleared for low risk foot surgery. See attached notes.    Current medications which may produce withdrawal symptoms if withheld perioperatively: none    1. Preoperative workup as follows chest x-ray, ECG, hemoglobin, hematocrit, electrolytes, creatinine, glucose, liver function studies.  2. Change in medication regimen before surgery hold ASA as directed by surgeon. Take other as usual with sip of water  3. Prophylaxis for cardiac events with perioperative beta-blockers: not indicated.  4. Invasive hemodynamic monitoring perioperatively: at the discretion of anesthesiologist.  5. Deep vein thrombosis prophylaxis postoperatively:regimen to be chosen by surgical team.  6. Surveillance for postoperative MI with ECG immediately postoperatively and on postoperative days 1 and 2 AND troponin levels 24 hours postoperatively and on day 4 or hospital discharge (whichever comes first): not indicated.

## 2018-11-29 PROBLEM — E66.9 CLASS 1 OBESITY IN ADULT: Status: ACTIVE | Noted: 2018-11-29

## 2018-11-29 PROBLEM — R94.31 ABNORMAL EKG: Status: ACTIVE | Noted: 2018-11-29

## 2018-11-29 PROCEDURE — 93000 ELECTROCARDIOGRAM COMPLETE: CPT | Performed by: INTERNAL MEDICINE

## 2018-11-29 NOTE — PROGRESS NOTES
"      Subjective   Anselmo Calderón is a 58 y.o. male.  Shilpi Dhaliwal, DNP, APRN  Shilpi Dhaliwal DNP, APRN  [address]  [place] [address]      Chief Complaint   Patient presents with   • Abnormal ECG   • Surgical Clearance       Patient Active Problem List    Diagnosis Date Noted   • Chest pain 11/30/2018     Priority: High   • Myocardial infarct, old 10/04/2018     Priority: High     Note Last Updated: 11/30/2018     Probable type 2 at age 38. Attributed to stress     • Tobacco chew use 11/30/2018     Priority: Low   • Class 1 obesity in adult 11/29/2018     Priority: Low   • Diabetes (CMS/[facility])      Priority: Low   • Hyperlipidemia LDL goal <70    • Depression    • Degenerative joint disease of ankle and foot, right    • Renal stone       History of Present Illness   This is a 58-year-old dyslipidemic diabetic male with a history of MI at age 38 which was attributed to stress.  He states he was told he had a myocardial infarction that \"barely met criteria\" .  He was evaluated at [facility], did not have cardiac catheterization.  He states he wore a monitor for a few days to monitor PVCs and was started on Klonopin.  He had no further testing.  He was recently evaluated at primary care for preprocedure clearance.  During that exam he is found to have an abnormal EKG and referred or service for further evaluation.  He does not have specific exercise regimen but does stay busy working on the [employer].  He has noticed recent random episodes of dyspnea which are brief.  He has a separate complaint of left sided chest discomfort which she calls \"cramp\".  This is not related to exertion, last one to 2 minutes, is 3-4/10 in intensity and not associated shortness of breath nausea diaphoresis or radiating pain.  He has been on his current dose of atorvastatin for 4-5 years.  He admits to using oral tobacco but signed up for a cessation program just yesterday.  He has no " complaint of orthopnea, PND, claudication, lower extremity edema.  He denies awareness of tachycardia arrhythmias, dizziness or syncope.    Past Surgical History:   Procedure Laterality Date   • ANKLE SURGERY Right     5 times. Initial trauma and subsequent complications from surg.   • INGUINAL HERNIA REPAIR Bilateral     multiple with mesh   • MOUTH SURGERY     • TONSILLECTOMY         The following portions of the patient's history were reviewed and updated as appropriate: allergies, current medications, past family history, past medical history, past social history, past surgical history and problem list.    No Known Allergies    Current Outpatient Medications:   •  aspirin 81 MG EC tablet, Take 81 mg by mouth Daily., Disp: , Rfl:   •  atorvastatin (LIPITOR) 10 MG tablet, Take 10 mg by mouth Daily., Disp: , Rfl:   •  diclofenac (VOLTAREN) 1 % gel gel, Apply 4 g topically to the appropriate area as directed 3 (Three) Times a Day., Disp: 100 g, Rfl: 0  •  escitalopram (LEXAPRO) 20 MG tablet, Take 1 tablet by mouth Daily., Disp: 90 tablet, Rfl: 1  •  metFORMIN (GLUCOPHAGE) 850 MG tablet, Take 850 mg by mouth 2 (Two) Times a Day With Meals., Disp: , Rfl:   •  Multiple Vitamins-Minerals (MULTIVITAMIN ADULT PO), Take  by mouth., Disp: , Rfl:   •  SITagliptin (JANUVIA) 100 MG tablet, Take 100 mg by mouth Daily., Disp: , Rfl:   •  tadalafil (CIALIS) 5 MG tablet, Take 5 mg by mouth Daily As Needed for erectile dysfunction., Disp: , Rfl:     Review of Systems   Constitution: Negative.   HENT: Negative.    Eyes: Negative.    Cardiovascular: Positive for chest pain.   Respiratory: Positive for shortness of breath.    Endocrine: Negative.    Hematologic/Lymphatic: Negative.    Skin: Negative.    Musculoskeletal: Negative.    Gastrointestinal: Negative.    Genitourinary: Negative.    Neurological: Negative.    Psychiatric/Behavioral: Negative.    Allergic/Immunologic: Negative.    All other systems reviewed and are  "negative.      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 He is their caregiver. Fadia is home.    Moved her in May. Previously in [facility].       Family History   Problem Relation Age of Onset   • No Known Problems Mother         estanged   • No Known Problems Father         estranged   • Depression Sister    • Diabetes Sister         Type 1   • Depression Brother    • No Known Problems Daughter    • Depression Son    • Alcohol abuse Maternal Grandmother    • Lung cancer Maternal Grandfather    • Brain cancer Paternal Grandmother    • Suicidality Paternal Grandmother    • Aneurysm Paternal Grandfather        Objective      Blood pressure 118/80, pulse 75, height 188 cm (74\"), weight 108 kg (239 lb).    Physical Exam   Constitutional: He is oriented to person, place, and time. He appears well-developed and well-nourished.   HENT:   Head: Normocephalic and atraumatic.   Mouth/Throat: Oropharynx is clear and moist.   Eyes: EOM are normal. Pupils are equal, round, and reactive to light. No scleral icterus.   Neck: Neck supple. No JVD present. No thyromegaly present.   Cardiovascular: Normal rate, regular " rhythm and normal heart sounds. Exam reveals no gallop and no friction rub.   No murmur heard.  Pulmonary/Chest: Breath sounds normal. No stridor. He has no wheezes. He has no rales.   Abdominal: Soft. Bowel sounds are normal. He exhibits no distension and no mass. There is no tenderness.   Musculoskeletal: Normal range of motion. He exhibits no edema, tenderness or deformity.   Lymphadenopathy:     He has no cervical adenopathy.   Neurological: He is alert and oriented to person, place, and time. No cranial nerve deficit. Coordination normal.   Skin: Skin is warm and dry. No rash noted. No erythema.   Psychiatric: He has a normal mood and affect.   Vitals reviewed.                ECG 12 Lead  Date/Time: 11/29/2018 2:38 PM  Performed by: Joie Giang MD  Authorized by: Joie Giang MD   Rhythm: sinus rhythm  Rate: normal  Conduction: 1st degree  ST Segments: ST segments normal  T Waves: T waves normal  QRS axis: normal  Clinical impression: non-specific ECG  Comments: Normal EKG with exception of first degree AV block  Rate 75            Lab Review:   Lab Results   Component Value Date    GLUCOSE 184 (H) 11/27/2018    BUN 21 11/27/2018    CREATININE 1.02 11/27/2018    EGFRIFNONA 75 11/27/2018    BCR 20.6 11/27/2018    CO2 26.0 11/27/2018    CALCIUM 9.1 11/27/2018    ALBUMIN 4.53 11/27/2018    AST 20 11/27/2018    ALT 25 11/27/2018       Lab Results   Component Value Date    WBC 9.89 11/27/2018    HGB 14.8 11/27/2018    HCT 46.2 11/27/2018    MCV 93.5 11/27/2018     11/27/2018       Lab Results   Component Value Date    HGBA1C 7.60 (H) 10/04/2018       Lab Results   Component Value Date    TSH 0.888 10/04/2018       Lab Results   Component Value Date    CHOL 187 10/04/2018     Lab Results   Component Value Date    TRIG 165 (H) 10/04/2018     Lab Results   Component Value Date    HDL 50 10/04/2018     Lab Results   Component Value Date     10/04/2018       Assessment:   Diagnosis Plan   1. Other forms  of angina pectoris (CMS/HCC)  Stress Test With Myocardial Perfusion (1 Day)    Adult Transthoracic Echo Complete W/ Cont if Necessary Per Protocol   2. Abnormal EKG  ECG 12 Lead   3. Myocardial infarct, old     4. Hyperlipidemia LDL goal <70     5. Tobacco chew use        Plan:  1. Cleared for anesthesia and surgery for low risk foot operation from a cardiac standpoint.  2. Due to multiple risk factors and history of atypical chest pain we have recommended echocardiogram and Cardiolite stress test for further evaluation.  3. Increase Lipitor to 40 mg daily, continue rest of the current medications.   4. Follow-up in 3 months, sooner as needed.  5. Thank you for allowing us to participate in the care of your patient.     Scribed for Joie Giang MD by Electronically signed by VELASQUEZ Barragan,11/29/18, 2:37 PM.    I, Joie Giang MD, personally performed the services described in this documentation as scribed by the above named individual in my presence, and it is both accurate and complete.  11/30/2018  11:38 AM

## 2018-11-30 ENCOUNTER — CONSULT (OUTPATIENT)
Dept: CARDIOLOGY | Facility: CLINIC | Age: 58
End: 2018-11-30

## 2018-11-30 ENCOUNTER — TELEPHONE (OUTPATIENT)
Dept: FAMILY MEDICINE CLINIC | Facility: CLINIC | Age: 58
End: 2018-11-30

## 2018-11-30 VITALS
SYSTOLIC BLOOD PRESSURE: 118 MMHG | HEIGHT: 74 IN | HEART RATE: 75 BPM | DIASTOLIC BLOOD PRESSURE: 80 MMHG | BODY MASS INDEX: 30.67 KG/M2 | WEIGHT: 239 LBS

## 2018-11-30 DIAGNOSIS — Z72.0 TOBACCO CHEW USE: ICD-10-CM

## 2018-11-30 DIAGNOSIS — I20.8 OTHER FORMS OF ANGINA PECTORIS (HCC): Primary | ICD-10-CM

## 2018-11-30 DIAGNOSIS — R94.31 ABNORMAL EKG: ICD-10-CM

## 2018-11-30 DIAGNOSIS — I25.2 MYOCARDIAL INFARCT, OLD: ICD-10-CM

## 2018-11-30 DIAGNOSIS — E78.5 HYPERLIPIDEMIA LDL GOAL <70: ICD-10-CM

## 2018-11-30 PROBLEM — R07.9 CHEST PAIN: Status: ACTIVE | Noted: 2018-11-30

## 2018-11-30 PROCEDURE — 99243 OFF/OP CNSLTJ NEW/EST LOW 30: CPT | Performed by: INTERNAL MEDICINE

## 2018-11-30 RX ORDER — ATORVASTATIN CALCIUM 40 MG/1
40 TABLET, FILM COATED ORAL DAILY
Qty: 90 TABLET | Refills: 3 | Status: SHIPPED | OUTPATIENT
Start: 2018-11-30 | End: 2020-01-25 | Stop reason: SDUPTHER

## 2018-12-05 ENCOUNTER — OFFICE VISIT (OUTPATIENT)
Dept: FAMILY MEDICINE CLINIC | Facility: CLINIC | Age: 58
End: 2018-12-05

## 2018-12-05 VITALS
DIASTOLIC BLOOD PRESSURE: 76 MMHG | SYSTOLIC BLOOD PRESSURE: 130 MMHG | HEIGHT: 74 IN | WEIGHT: 238 LBS | OXYGEN SATURATION: 96 % | HEART RATE: 70 BPM | RESPIRATION RATE: 16 BRPM | BODY MASS INDEX: 30.54 KG/M2 | TEMPERATURE: 97.7 F

## 2018-12-05 DIAGNOSIS — Z23 IMMUNIZATION DUE: ICD-10-CM

## 2018-12-05 DIAGNOSIS — Z00.00 HEALTH CARE MAINTENANCE: Primary | ICD-10-CM

## 2018-12-05 DIAGNOSIS — L98.9 SKIN LESION: ICD-10-CM

## 2018-12-05 PROCEDURE — 90632 HEPA VACCINE ADULT IM: CPT | Performed by: NURSE PRACTITIONER

## 2018-12-05 PROCEDURE — 90471 IMMUNIZATION ADMIN: CPT | Performed by: NURSE PRACTITIONER

## 2018-12-05 PROCEDURE — 90472 IMMUNIZATION ADMIN EACH ADD: CPT | Performed by: NURSE PRACTITIONER

## 2018-12-05 PROCEDURE — 90715 TDAP VACCINE 7 YRS/> IM: CPT | Performed by: NURSE PRACTITIONER

## 2018-12-05 PROCEDURE — 99396 PREV VISIT EST AGE 40-64: CPT | Performed by: NURSE PRACTITIONER
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
## 2018-12-21 ENCOUNTER — HOSPITAL ENCOUNTER (OUTPATIENT)
Dept: NUTRITION | Facility: HOSPITAL | Age: 58
Setting detail: RECURRING SERIES
Discharge: HOME | End: 2018-12-21

## 2018-12-21 VITALS — HEIGHT: 74 IN | WEIGHT: 235 LBS | BODY MASS INDEX: 30.16 KG/M2

## 2018-12-21 PROCEDURE — 97802 MEDICAL NUTRITION INDIV IN: CPT

## 2018-12-27 ENCOUNTER — APPOINTMENT (OUTPATIENT)
Dept: CARDIOLOGY | Facility: HOSPITAL | Age: 58
End: 2018-12-27

## 2019-01-11 ENCOUNTER — TELEPHONE (OUTPATIENT)
Dept: FAMILY MEDICINE CLINIC | Facility: CLINIC | Age: 59
End: 2019-01-11

## 2019-01-11 DIAGNOSIS — E11.8 TYPE 2 DIABETES MELLITUS WITH COMPLICATION, WITHOUT LONG-TERM CURRENT USE OF INSULIN (HCC): Primary | ICD-10-CM

## 2019-01-28 ENCOUNTER — HOSPITAL ENCOUNTER (OUTPATIENT)
Dept: NUTRITION | Facility: HOSPITAL | Age: 59
Setting detail: RECURRING SERIES
Discharge: HOME | End: 2019-01-28

## 2019-01-28 VITALS — WEIGHT: 237.2 LBS | HEIGHT: 74 IN | BODY MASS INDEX: 30.44 KG/M2

## 2019-02-25 ENCOUNTER — HOSPITAL ENCOUNTER (OUTPATIENT)
Dept: NUTRITION | Facility: HOSPITAL | Age: 59
Setting detail: RECURRING SERIES
Discharge: HOME | End: 2019-02-25

## 2019-02-25 ENCOUNTER — OFFICE VISIT (OUTPATIENT)
Dept: FAMILY MEDICINE CLINIC | Facility: CLINIC | Age: 59
End: 2019-02-25

## 2019-02-25 VITALS
SYSTOLIC BLOOD PRESSURE: 142 MMHG | TEMPERATURE: 98.6 F | BODY MASS INDEX: 29.81 KG/M2 | WEIGHT: 232.25 LBS | DIASTOLIC BLOOD PRESSURE: 88 MMHG | HEART RATE: 88 BPM | OXYGEN SATURATION: 95 % | HEIGHT: 74 IN | RESPIRATION RATE: 18 BRPM

## 2019-02-25 VITALS — BODY MASS INDEX: 29.9 KG/M2 | WEIGHT: 233 LBS | HEIGHT: 74 IN

## 2019-02-25 DIAGNOSIS — Z78.9 RECENT FOREIGN TRAVEL: ICD-10-CM

## 2019-02-25 DIAGNOSIS — J18.9 COMMUNITY ACQUIRED PNEUMONIA, UNSPECIFIED LATERALITY: Primary | ICD-10-CM

## 2019-02-25 PROCEDURE — 99213 OFFICE O/P EST LOW 20 MIN: CPT | Performed by: NURSE PRACTITIONER

## 2019-02-25 PROCEDURE — 97803 MED NUTRITION INDIV SUBSEQ: CPT

## 2019-02-25 RX ORDER — ALBUTEROL SULFATE 90 UG/1
2 AEROSOL, METERED RESPIRATORY (INHALATION) EVERY 4 HOURS PRN
Qty: 1 INHALER | Refills: 11 | Status: SHIPPED | OUTPATIENT
Start: 2019-02-25 | End: 2020-01-25 | Stop reason: SDUPTHER

## 2019-02-25 RX ORDER — DEXTROMETHORPHAN HYDROBROMIDE AND PROMETHAZINE HYDROCHLORIDE 15; 6.25 MG/5ML; MG/5ML
5 SYRUP ORAL 4 TIMES DAILY PRN
Qty: 240 ML | Refills: 0 | Status: SHIPPED | OUTPATIENT
Start: 2019-02-25 | End: 2019-03-07 | Stop reason: SDUPTHER

## 2019-02-25 RX ORDER — LEVOFLOXACIN 750 MG/1
750 TABLET ORAL DAILY
Qty: 5 TABLET | Refills: 0 | Status: SHIPPED | OUTPATIENT
Start: 2019-02-25 | End: 2019-03-07

## 2019-02-25 NOTE — PROGRESS NOTES
Subjective   Anselmo Calderón is a 58 y.o. male.     History of Present Illness Complains of 8 days of dry cough, chest congestion x 8 days. Chills off and on. No sob or wheezing. He had some myalgia, watery eyes and clear nasal discharge. Denies chest pain. Recent trip to [place].    The following portions of the patient's history were reviewed and updated as appropriate: allergies, current medications, past family history, past medical history, past social history, past surgical history and problem list.    Review of Systems   Constitutional: Positive for fever. Negative for appetite change and unexpected weight loss.   HENT: Positive for postnasal drip and rhinorrhea. Negative for nosebleeds, sore throat and trouble swallowing.    Eyes: Negative for visual disturbance.   Respiratory: Positive for cough and wheezing. Negative for shortness of breath.    Cardiovascular: Negative for chest pain, palpitations and leg swelling.   Gastrointestinal: Negative for abdominal pain, blood in stool, constipation, diarrhea, nausea and vomiting.   Endocrine: Negative for polydipsia, polyphagia and polyuria.   Genitourinary: Negative for urinary incontinence, dysuria, frequency and hematuria.   Musculoskeletal: Negative for arthralgias, gait problem, joint swelling and myalgias.   Skin: Negative for rash.   Neurological: Negative for dizziness, seizures, syncope and numbness.   Hematological: Negative for adenopathy. Does not bruise/bleed easily.   Psychiatric/Behavioral: Negative for sleep disturbance and depressed mood. The patient is not nervous/anxious.        Objective   Physical Exam   Constitutional: He is oriented to person, place, and time. He appears well-developed and well-nourished. No distress.   HENT:   Head: Normocephalic and atraumatic.   Right Ear: Tympanic membrane and external ear normal.   Left Ear: Tympanic membrane and external ear normal.   Nose: Nose normal.   Mouth/Throat: Oropharynx is clear and moist.  No oropharyngeal exudate.   Eyes: Conjunctivae are normal. Pupils are equal, round, and reactive to light. Right eye exhibits no discharge. Left eye exhibits no discharge. No scleral icterus.   Neck: Neck supple. No tracheal deviation present. No thyromegaly present.   Cardiovascular: Normal rate, regular rhythm and normal heart sounds. Exam reveals no gallop and no friction rub.   No murmur heard.  Pulmonary/Chest: Effort normal. No respiratory distress. He has wheezes.   RML with rales   Abdominal: Soft. Bowel sounds are normal. He exhibits no distension and no mass. There is no tenderness.   Musculoskeletal: He exhibits no edema or deformity.   Lymphadenopathy:     He has no cervical adenopathy.   Neurological: He is alert and oriented to person, place, and time. Coordination normal.   Skin: Skin is warm and dry. Capillary refill takes less than 2 seconds. No rash noted. No erythema.   Psychiatric: He has a normal mood and affect. His speech is normal and behavior is normal. Judgment and thought content normal.   Nursing note and vitals reviewed.        Assessment/Plan   Anselmo was seen today for pneumonia.    Diagnoses and all orders for this visit:    Community acquired pneumonia, unspecified laterality  -     XR Chest PA & Lateral; Future  -     levoFLOXacin (LEVAQUIN) 750 MG tablet; Take 1 tablet by mouth Daily.  -     albuterol sulfate  (90 Base) MCG/ACT inhaler; Inhale 2 puffs Every 4 (Four) Hours As Needed for Wheezing or Shortness of Air.  -     promethazine-dextromethorphan (PROMETHAZINE-DM) 6.25-15 MG/5ML syrup; Take 5 mL by mouth 4 (Four) Times a Day As Needed for Cough.    Recent foreign travel    Increase fluids. Use inhaler 2 puffs qid. Instructions on proper use of inhaler provided. Follow up symptoms persist or worsen.  Discussed the nature of the disease including, risks, complications, implications, management, safe and proper use of medications. Encouraged therapeutic lifestyle changes  including low calorie diet with plenty of fruits and vegetables, daily exercise, medication compliance, and keeping scheduled follow up appointments with me and any other providers. Encouraged patient to have appointment for complete physical, fasting labs, appropriate screenings, and immunizations on an annual basis.  Follow up symptoms persist or worsen or go to ER.

## 2019-02-26 ENCOUNTER — HOSPITAL ENCOUNTER (OUTPATIENT)
Dept: GENERAL RADIOLOGY | Facility: HOSPITAL | Age: 59
Discharge: HOME | End: 2019-02-26
Admitting: NURSE PRACTITIONER

## 2019-02-26 DIAGNOSIS — J18.9 COMMUNITY ACQUIRED PNEUMONIA, UNSPECIFIED LATERALITY: ICD-10-CM

## 2019-02-26 PROCEDURE — 71046 X-RAY EXAM CHEST 2 VIEWS: CPT

## 2019-03-04 ENCOUNTER — TELEPHONE (OUTPATIENT)
Dept: FAMILY MEDICINE CLINIC | Facility: CLINIC | Age: 59
End: 2019-03-04

## 2019-03-04 DIAGNOSIS — Z01.818 PRE-OPERATIVE CLEARANCE: ICD-10-CM

## 2019-03-04 DIAGNOSIS — R05.3 PERSISTENT COUGH: Primary | ICD-10-CM

## 2019-03-04 DIAGNOSIS — R93.89 ABNORMAL CHEST X-RAY: ICD-10-CM

## 2019-03-04 NOTE — TELEPHONE ENCOUNTER
Anselmo messaged me to let me know he feels better but is still coughing. He had some questions about his chest x-ray and scarring in his lungs. He is not a smoker. He is having septal repair in 2 weeks. Will try to have pulm see him prior to surgery.

## 2019-03-07 ENCOUNTER — OFFICE VISIT (OUTPATIENT)
Dept: PULMONOLOGY | Facility: CLINIC | Age: 59
End: 2019-03-07

## 2019-03-07 VITALS
TEMPERATURE: 97.3 F | BODY MASS INDEX: 30.93 KG/M2 | HEIGHT: 74 IN | SYSTOLIC BLOOD PRESSURE: 116 MMHG | OXYGEN SATURATION: 94 % | HEART RATE: 88 BPM | DIASTOLIC BLOOD PRESSURE: 92 MMHG | WEIGHT: 241 LBS | RESPIRATION RATE: 18 BRPM

## 2019-03-07 DIAGNOSIS — Z01.818 PREOPERATIVE CLEARANCE: Primary | ICD-10-CM

## 2019-03-07 DIAGNOSIS — R91.8 ABNORMAL CT SCAN, LUNG: ICD-10-CM

## 2019-03-07 LAB
CRP SERPL-MCNC: 0.33 MG/DL (ref 0–1)
ERYTHROCYTE [SEDIMENTATION RATE] IN BLOOD: 14 MM/HR (ref 0–20)

## 2019-03-07 PROCEDURE — 82164 ANGIOTENSIN I ENZYME TEST: CPT | Performed by: NURSE PRACTITIONER

## 2019-03-07 PROCEDURE — 86225 DNA ANTIBODY NATIVE: CPT | Performed by: NURSE PRACTITIONER

## 2019-03-07 PROCEDURE — 99214 OFFICE O/P EST MOD 30 MIN: CPT | Performed by: NURSE PRACTITIONER

## 2019-03-07 PROCEDURE — 86615 BORDETELLA ANTIBODY: CPT | Performed by: NURSE PRACTITIONER

## 2019-03-07 PROCEDURE — 86602 ANTINOMYCES ANTIBODY: CPT | Performed by: NURSE PRACTITIONER

## 2019-03-07 PROCEDURE — 86235 NUCLEAR ANTIGEN ANTIBODY: CPT | Performed by: NURSE PRACTITIONER

## 2019-03-07 PROCEDURE — 85652 RBC SED RATE AUTOMATED: CPT | Performed by: NURSE PRACTITIONER

## 2019-03-07 PROCEDURE — 36415 COLL VENOUS BLD VENIPUNCTURE: CPT | Performed by: NURSE PRACTITIONER

## 2019-03-07 PROCEDURE — 86671 FUNGUS NES ANTIBODY: CPT | Performed by: NURSE PRACTITIONER

## 2019-03-07 PROCEDURE — 94729 DIFFUSING CAPACITY: CPT | Performed by: NURSE PRACTITIONER

## 2019-03-07 PROCEDURE — 86331 IMMUNODIFFUSION OUCHTERLONY: CPT | Performed by: NURSE PRACTITIONER

## 2019-03-07 PROCEDURE — 94726 PLETHYSMOGRAPHY LUNG VOLUMES: CPT | Performed by: NURSE PRACTITIONER

## 2019-03-07 PROCEDURE — 86609 BACTERIUM ANTIBODY: CPT | Performed by: NURSE PRACTITIONER

## 2019-03-07 PROCEDURE — 94375 RESPIRATORY FLOW VOLUME LOOP: CPT | Performed by: NURSE PRACTITIONER

## 2019-03-07 PROCEDURE — 86140 C-REACTIVE PROTEIN: CPT | Performed by: NURSE PRACTITIONER

## 2019-03-07 PROCEDURE — 86606 ASPERGILLUS ANTIBODY: CPT | Performed by: NURSE PRACTITIONER

## 2019-03-07 RX ORDER — BENZONATATE 100 MG/1
100 CAPSULE ORAL 3 TIMES DAILY PRN
Qty: 42 CAPSULE | Refills: 0 | Status: SHIPPED | OUTPATIENT
Start: 2019-03-07 | End: 2020-01-25

## 2019-03-07 RX ORDER — DEXTROMETHORPHAN HYDROBROMIDE AND PROMETHAZINE HYDROCHLORIDE 15; 6.25 MG/5ML; MG/5ML
5 SYRUP ORAL 4 TIMES DAILY PRN
COMMUNITY
End: 2020-01-25

## 2019-03-07 RX ORDER — AZITHROMYCIN 250 MG/1
TABLET, FILM COATED ORAL
Qty: 6 TABLET | Refills: 0 | Status: SHIPPED | OUTPATIENT
Start: 2019-03-07 | End: 2020-01-25

## 2019-03-07 NOTE — PROGRESS NOTES
[facility]    CHIEF COMPLAINT    Cough, abnormal CXR    Refered by:  Shilpi Dhaliwal D*        HISTORY OF PRESENT ILLNESS    Anselmo Calderón is a 58 y.o.male here today for several days of cough and congestion.  Completed a course of Levaquin, cough syrup and albuterol rescue inhaler as needed.   The albuterol made no difference in his cough.  The cough has persisted over the last month, and has been quite severe at times.  He has had episodes of retching while coughing.  It is only minimally improved after his antibiotics.  He is unable to take steroids secondary to his diabetes.  The promethazine cough syrup does help but it makes him very sleepy.  He has had some fevers and chills with worsening of fatigue since his acute illness.    He does have several episodes of bronchitis or coughing spells throughout the years.  This has by far been the worst and longest episode.  He has had no prior history of asthma or airway disease.  He denies any seasonal allergies.  He does have some occupational exposure.  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    He is a non-smoker.  No significant family history of lung disease.    Answers for HPI/ROS submitted by the patient on 3/6/2019   Cough  Chronicity: recurrent  Onset: 1 to 4 weeks ago  Progression since onset: gradually improving  Frequency: hourly  Cough characteristics: non-productive  ear congestion: No  heartburn: No  hemoptysis: No  nasal congestion: No  sweats: Yes  weight loss: No  Aggravated by: nothing  Risk factors for lung disease: animal exposure, travel      Patient Active Problem List   Diagnosis   • Hyperlipidemia LDL goal <70   • Diabetes (CMS/HCC)   • Depression   • Degenerative joint disease of ankle and foot, right   • Renal stone   • Myocardial infarct, old   • Class 1 obesity in adult   • Tobacco chew use   • Chest pain       No Known Allergies    Current Outpatient  Medications:   •  albuterol sulfate  (90 Base) MCG/ACT inhaler, Inhale 2 puffs Every 4 (Four) Hours As Needed for Wheezing or Shortness of Air., Disp: 1 inhaler, Rfl: 11  •  aspirin 81 MG EC tablet, Take 81 mg by mouth Daily., Disp: , Rfl:   •  atorvastatin (LIPITOR) 40 MG tablet, Take 1 tablet by mouth Daily., Disp: 90 tablet, Rfl: 3  •  diclofenac (VOLTAREN) 1 % gel gel, Apply 4 g topically to the appropriate area as directed 3 (Three) Times a Day., Disp: 100 g, Rfl: 0  •  escitalopram (LEXAPRO) 20 MG tablet, Take 1 tablet by mouth Daily., Disp: 90 tablet, Rfl: 1  •  metFORMIN (GLUCOPHAGE) 850 MG tablet, Take 1 tablet by mouth 2 (Two) Times a Day With Meals., Disp: 180 tablet, Rfl: 1  •  Multiple Vitamins-Minerals (MULTIVITAMIN ADULT PO), Take  by mouth., Disp: , Rfl:   •  promethazine-dextromethorphan (PROMETHAZINE-DM) 6.25-15 MG/5ML syrup, Take 5 mL by mouth 4 (Four) Times a Day As Needed for Cough., Disp: , Rfl:   •  SITagliptin (JANUVIA) 100 MG tablet, Take 1 tablet by mouth Daily., Disp: 90 tablet, Rfl: 1  •  tadalafil (CIALIS) 5 MG tablet, Take 5 mg by mouth Daily As Needed for erectile dysfunction., Disp: , Rfl:   •  azithromycin (ZITHROMAX) 250 MG tablet, Take 2 by mouth today then 1 daily for 4 days, Disp: 6 tablet, Rfl: 0  •  benzonatate (TESSALON) 100 MG capsule, Take 1 capsule by mouth 3 (Three) Times a Day As Needed for Cough., Disp: 42 capsule, Rfl: 0    MEDICATION LIST AND ALLERGIES REVIEWED.    Social History     Tobacco Use   • Smoking status: Never Smoker   • Smokeless tobacco: Current User     Types: Chew   • Tobacco comment: Use chewing tobacco   Substance Use Topics   • Alcohol use: Yes     Types: 1 Glasses of wine per week     Comment: Very Rarely    • Drug use: No       FAMILY AND SOCIAL HISTORY REVIEWED AND UPDATED IN EPIC    Review of Systems   Constitutional: Positive for fatigue and fever. Negative for chills and unexpected weight change.   HENT: Negative for congestion, ear pain,  "nosebleeds, postnasal drip, rhinorrhea, sinus pressure, sore throat and trouble swallowing.    Respiratory: Positive for cough and chest tightness. Negative for shortness of breath and wheezing.    Cardiovascular: Positive for chest pain. Negative for leg swelling.   Gastrointestinal: Negative for abdominal pain, constipation, diarrhea, nausea and vomiting.   Genitourinary: Negative for dysuria, frequency, hematuria and urgency.   Musculoskeletal: Positive for myalgias.   Skin: Negative for rash.   Neurological: Positive for headaches. Negative for dizziness, weakness and numbness.   All other systems reviewed and are negative.  .    /92 (BP Location: Left arm, Patient Position: Sitting, Cuff Size: Adult)   Pulse 88   Temp 97.3 °F (36.3 °C)   Resp 18   Ht 188 cm (74\")   Wt 109 kg (241 lb)   SpO2 94% Comment: room air at rest  BMI 30.94 kg/m²   Immunization History   Administered Date(s) Administered   • FLUARIX/FLUZONE/AFLURIA/FLULAVAL QUAD 10/04/2018   • Hepatitis A 12/05/2018   • Pneumococcal Polysaccharide (PPSV23) 10/04/2018   • Tdap 12/05/2018       Physical Exam   Constitutional: He is oriented to person, place, and time. He appears well-developed and well-nourished.   HENT:   Head: Normocephalic and atraumatic.   Mouth/Throat: Posterior oropharyngeal erythema present.   Eyes: EOM are normal. Pupils are equal, round, and reactive to light.   Neck: Normal range of motion. Neck supple.   Cardiovascular: Normal rate and regular rhythm.   No murmur heard.  Pulmonary/Chest: Effort normal and breath sounds normal. No respiratory distress. He has no wheezes. He has no rales.   Abdominal: Soft. Bowel sounds are normal. He exhibits no distension.   Musculoskeletal: Normal range of motion. He exhibits no edema.   Neurological: He is alert and oriented to person, place, and time.   Skin: Skin is warm and dry. No erythema.   Psychiatric: He has a normal mood and affect. His behavior is normal.   Vitals " reviewed.      RESULTS    Lab Results   Component Value Date    WBC 9.89 11/27/2018    HGB 14.8 11/27/2018    HCT 46.2 11/27/2018    MCV 93.5 11/27/2018     11/27/2018     Lab Results   Component Value Date    GLUCOSE 184 (H) 11/27/2018    CALCIUM 9.1 11/27/2018     11/27/2018    K 4.5 11/27/2018    CO2 26.0 11/27/2018     11/27/2018    BUN 21 11/27/2018    CREATININE 1.02 11/27/2018    EGFRIFNONA 75 11/27/2018    BCR 20.6 11/27/2018    ANIONGAP 6.0 11/27/2018       PFTs done in the office today, and read by me:  Restrictive airway disease with an FVC of 3.64, 63% total lung capacity 4.66, 59%  No obstruction with a ratio of 91%  Normal diffusion    EXAMINATION: XR CHEST PA AND LATERAL- 02/26/2019      INDICATION: J18.9-Pneumonia, unspecified organism      COMPARISON: Chest x-ray 11/27/2018     FINDINGS: Cardiac size within normal limits. Pulmonary vascularity  within normal limits. Chronic lung changes creating perihilar and  bibasilar atelectasis and scarring without focal opacification or  consolidation. No pneumothorax or significant pleural effusion.  Degenerative changes of the spine with exaggerated kyphotic curvature.          IMPRESSION:  No acute cardiopulmonary process, specifically no focal  consolidation to suggest acute pneumonia. No effusion.     D:  02/26/2019  E:  02/26/2019     This report was finalized on 2/26/2019 4:17 PM by Dr. Kike Neal.           PROBLEM LIST    Problem List Items Addressed This Visit     None      Visit Diagnoses     Preoperative clearance    -  Primary    Relevant Orders    Full Pulmonary Function Test Without Bronchodilator (Completed)    Abnormal CT scan, lung        Relevant Orders    [facility] Comprehensive Panel    Angiotensin Converting Enzyme    Sedimentation Rate    C-reactive Protein    Anti-Smith Antibody    CT Chest Hi Resolution    Hypersensitivity Pneumonitis Profile    Bordetella Pertussis Antibody            DISCUSSION    We went over his  testing today in the office.  Given his restriction on his PFTs, a chronic recurring cough, as well as an abnormal chest x-ray with significant occupational exposures I would like to follow-up on a high-resolution CT scan to evaluate for interstitial lung disease.  Also follow-up on his some serologic testing for interstitial lung disease with a chronic cough.    To suppress his cough I gave him some Asmanex to use twice daily, with a spacer.  I instructed him to rinse his mouth out after each use.  He is intolerant to oral or systemic steroids secondary to his diabetes.  Tessalon Perles to use 3 times a day.  He can continue on his promethazine cough syrup at night to help with cough suppression and sleep.  Delsym over-the-counter can also be used during the day to help with cough suppression.  I would also like him to complete a course of azithromycin.    Follow-up on 2 weeks with the above testing.      LINA Brooks  03/07/201910:01 AM  Electronically signed     Please note that portions of this note were completed with a voice recognition program. Efforts were made to edit the dictations, but occasionally words are mistranscribed.      CC: Shilpi Dhaliwal, CRISTINO, APRN

## 2019-03-11 LAB
ACE SERPL-CCNC: 58 U/L (ref 14–82)
CENTROMERE B AB SER-ACNC: <0.2 AI (ref 0–0.9)
CHROMATIN AB SERPL-ACNC: <0.2 AI (ref 0–0.9)
DSDNA AB SER-ACNC: <1 IU/ML (ref 0–9)
ENA JO1 AB SER-ACNC: <0.2 AI (ref 0–0.9)
ENA RNP AB SER-ACNC: <0.2 AI (ref 0–0.9)
ENA SCL70 AB SER-ACNC: <0.2 AI (ref 0–0.9)
ENA SM AB SER-ACNC: <0.2 AI (ref 0–0.9)
ENA SM AB SER-ACNC: <0.2 AI (ref 0–0.9)
ENA SS-A AB SER-ACNC: <0.2 AI (ref 0–0.9)
ENA SS-B AB SER-ACNC: <0.2 AI (ref 0–0.9)
Lab: NORMAL

## 2019-03-12 LAB
B PERT IGA SER QL IA: 1.6 INDEX (ref 0–0.9)
B PERT IGG SER-ACNC: 3.77 INDEX (ref 0–0.94)
B PERT IGM SER QL IA: 2.3 INDEX (ref 0–0.9)

## 2019-03-13 ENCOUNTER — HOSPITAL ENCOUNTER (OUTPATIENT)
Dept: CT IMAGING | Facility: HOSPITAL | Age: 59
Discharge: HOME | End: 2019-03-13
Admitting: NURSE PRACTITIONER

## 2019-03-13 ENCOUNTER — TELEPHONE (OUTPATIENT)
Dept: PULMONOLOGY | Facility: CLINIC | Age: 59
End: 2019-03-13

## 2019-03-13 LAB
A FUMIGATUS1 AB SER QL ID: NEGATIVE
A PULLULANS AB SER QL: NEGATIVE
LACEYELLA SACCHARI AB SER QL: NEGATIVE
MICROPOLYSPORA FAENI: NEGATIVE
PIGEON SERUM AB QL ID: NEGATIVE
T VULGARIS AB SER QL ID: NEGATIVE

## 2019-03-13 PROCEDURE — 71250 CT THORAX DX C-: CPT

## 2019-03-13 NOTE — TELEPHONE ENCOUNTER
Called test results, positive pertussis would be the cause of this cough.  Other testing negative.  HRCT done today, results pending.    Cough is actually already doing better with current treatment.    Follow up again if worsens.

## 2019-03-19 DIAGNOSIS — F32.9 MAJOR DEPRESSIVE DISORDER WITH SINGLE EPISODE, REMISSION STATUS UNSPECIFIED: ICD-10-CM

## 2019-03-19 RX ORDER — ESCITALOPRAM OXALATE 20 MG/1
TABLET ORAL
Qty: 90 TABLET | Refills: 1 | Status: SHIPPED | OUTPATIENT
Start: 2019-03-19 | End: 2019-08-29 | Stop reason: SDUPTHER

## 2019-03-26 ENCOUNTER — PATIENT MESSAGE (OUTPATIENT)
Dept: CARDIOLOGY | Facility: CLINIC | Age: 59
End: 2019-03-26

## 2019-06-13 ENCOUNTER — LAB REQUISITION (OUTPATIENT)
Dept: LAB | Facility: HOSPITAL | Age: 59
End: 2019-06-13

## 2019-06-13 DIAGNOSIS — J34.2 DEVIATED NASAL SEPTUM: ICD-10-CM

## 2019-06-13 DIAGNOSIS — J32.0 CHRONIC MAXILLARY SINUSITIS: ICD-10-CM

## 2019-06-13 PROCEDURE — 88305 TISSUE EXAM BY PATHOLOGIST: CPT | Performed by: OTOLARYNGOLOGY

## 2019-06-13 PROCEDURE — 88311 DECALCIFY TISSUE: CPT | Performed by: OTOLARYNGOLOGY

## 2019-06-14 LAB
CYTO UR: NORMAL
LAB AP CASE REPORT: NORMAL
LAB AP CLINICAL INFORMATION: NORMAL
PATH REPORT.FINAL DX SPEC: NORMAL
PATH REPORT.GROSS SPEC: NORMAL

## 2019-07-23 DIAGNOSIS — E11.8 TYPE 2 DIABETES MELLITUS WITH COMPLICATION, WITHOUT LONG-TERM CURRENT USE OF INSULIN (HCC): ICD-10-CM

## 2019-07-24 RX ORDER — SITAGLIPTIN 100 MG/1
TABLET, FILM COATED ORAL
Qty: 90 TABLET | Refills: 0 | Status: SHIPPED | OUTPATIENT
Start: 2019-07-24 | End: 2020-01-25 | Stop reason: SDUPTHER

## 2019-08-29 DIAGNOSIS — F32.9 MAJOR DEPRESSIVE DISORDER WITH SINGLE EPISODE, REMISSION STATUS UNSPECIFIED: ICD-10-CM

## 2019-08-29 RX ORDER — ESCITALOPRAM OXALATE 20 MG/1
20 TABLET ORAL DAILY
Qty: 90 TABLET | Refills: 0 | Status: SHIPPED | OUTPATIENT
Start: 2019-08-29

## 2020-01-25 ENCOUNTER — OFFICE VISIT (OUTPATIENT)
Dept: FAMILY MEDICINE CLINIC | Facility: CLINIC | Age: 60
End: 2020-01-25

## 2020-01-25 VITALS
HEIGHT: 74 IN | HEART RATE: 91 BPM | SYSTOLIC BLOOD PRESSURE: 136 MMHG | OXYGEN SATURATION: 95 % | TEMPERATURE: 97 F | BODY MASS INDEX: 29.93 KG/M2 | RESPIRATION RATE: 16 BRPM | DIASTOLIC BLOOD PRESSURE: 88 MMHG | WEIGHT: 233.2 LBS

## 2020-01-25 DIAGNOSIS — Z76.0 MEDICATION REFILL: ICD-10-CM

## 2020-01-25 DIAGNOSIS — E11.8 TYPE 2 DIABETES MELLITUS WITH COMPLICATION, WITHOUT LONG-TERM CURRENT USE OF INSULIN (HCC): ICD-10-CM

## 2020-01-25 DIAGNOSIS — R06.02 MILD SHORTNESS OF BREATH: ICD-10-CM

## 2020-01-25 DIAGNOSIS — B02.9 HERPES ZOSTER WITHOUT COMPLICATION: Primary | ICD-10-CM

## 2020-01-25 PROCEDURE — 99213 OFFICE O/P EST LOW 20 MIN: CPT | Performed by: NURSE PRACTITIONER

## 2020-01-25 PROCEDURE — 96372 THER/PROPH/DIAG INJ SC/IM: CPT | Performed by: NURSE PRACTITIONER

## 2020-01-25 RX ORDER — ALBUTEROL SULFATE 90 UG/1
2 AEROSOL, METERED RESPIRATORY (INHALATION) EVERY 4 HOURS PRN
Qty: 3 INHALER | Refills: 3 | Status: SHIPPED | OUTPATIENT
Start: 2020-01-25

## 2020-01-25 RX ORDER — DEXAMETHASONE SODIUM PHOSPHATE 4 MG/ML
4 INJECTION, SOLUTION INTRA-ARTICULAR; INTRALESIONAL; INTRAMUSCULAR; INTRAVENOUS; SOFT TISSUE ONCE
Status: COMPLETED | OUTPATIENT
Start: 2020-01-25 | End: 2020-01-25

## 2020-01-25 RX ORDER — VALACYCLOVIR HYDROCHLORIDE 1 G/1
1000 TABLET, FILM COATED ORAL 2 TIMES DAILY
Qty: 20 TABLET | Refills: 0 | Status: SHIPPED | OUTPATIENT
Start: 2020-01-25 | End: 2020-02-04

## 2020-01-25 RX ORDER — LIDOCAINE 50 MG/G
1 PATCH TOPICAL EVERY 24 HOURS
Qty: 15 PATCH | Refills: 1 | Status: SHIPPED | OUTPATIENT
Start: 2020-01-25

## 2020-01-25 RX ORDER — ATORVASTATIN CALCIUM 40 MG/1
40 TABLET, FILM COATED ORAL DAILY
Qty: 90 TABLET | Refills: 3 | Status: SHIPPED | OUTPATIENT
Start: 2020-01-25

## 2020-01-25 RX ADMIN — DEXAMETHASONE SODIUM PHOSPHATE 4 MG: 4 INJECTION, SOLUTION INTRA-ARTICULAR; INTRALESIONAL; INTRAMUSCULAR; INTRAVENOUS; SOFT TISSUE at 16:21

## 2020-01-25 NOTE — PATIENT INSTRUCTIONS
Shingles    Shingles, which is also known as herpes zoster, is an infection that causes a painful skin rash and fluid-filled blisters. It is caused by a virus.  Shingles only develops in people who:  · Have had chickenpox.  · Have been given a medicine to protect against chickenpox (have been vaccinated). Shingles is rare in this group.  What are the causes?  Shingles is caused by varicella-zoster virus (VZV). This is the same virus that causes chickenpox. After a person is exposed to VZV, the virus stays in the body in an inactive (dormant) state. Shingles develops if the virus is reactivated. This can happen many years after the first (initial) exposure to VZV. It is not known what causes this virus to be reactivated.  What increases the risk?  People who have had chickenpox or received the chickenpox vaccine are at risk for shingles. Shingles infection is more common in people who:  · Are older than age 60.  · Have a weakened disease-fighting system (immune system), such as people with:  ? HIV.  ? AIDS.  ? Cancer.  · Are taking medicines that weaken the immune system, such as transplant medicines.  · Are experiencing a lot of stress.  What are the signs or symptoms?  Early symptoms of this condition include itching, tingling, and pain in an area on your skin. Pain may be described as burning, stabbing, or throbbing.  A few days or weeks after early symptoms start, a painful red rash appears. The rash is usually on one side of the body and has a band-like or belt-like pattern. The rash eventually turns into fluid-filled blisters that break open, change into scabs, and dry up in about 2-3 weeks.  At any time during the infection, you may also develop:  · A fever.  · Chills.  · A headache.  · An upset stomach.  How is this diagnosed?  This condition is diagnosed with a skin exam. Skin or fluid samples may be taken from the blisters before a diagnosis is made. These samples are examined under a microscope or sent to  a lab for testing.  How is this treated?  The rash may last for several weeks. There is not a specific cure for this condition. Your health care provider will probably prescribe medicines to help you manage pain, recover more quickly, and avoid long-term problems. Medicines may include:  · Antiviral drugs.  · Anti-inflammatory drugs.  · Pain medicines.  · Anti-itching medicines (antihistamines).  If the area involved is on your face, you may be referred to a specialist, such as an eye doctor (ophthalmologist) or an ear, nose, and throat (ENT) doctor (otolaryngologist) to help you avoid eye problems, chronic pain, or disability.  Follow these instructions at home:  Medicines  · Take over-the-counter and prescription medicines only as told by your health care provider.  · Apply an anti-itch cream or numbing cream to the affected area as told by your health care provider.  Relieving itching and discomfort    · Apply cold, wet cloths (cold compresses) to the area of the rash or blisters as told by your health care provider.  · Cool baths can be soothing. Try adding baking soda or dry oatmeal to the water to reduce itching. Do not bathe in hot water.  Blister and rash care  · Keep your rash covered with a loose bandage (dressing). Wear loose-fitting clothing to help ease the pain of material rubbing against the rash.  · Keep your rash and blisters clean by washing the area with mild soap and cool water as told by your health care provider.  · Check your rash every day for signs of infection. Check for:  ? More redness, swelling, or pain.  ? Fluid or blood.  ? Warmth.  ? Pus or a bad smell.  · Do not scratch your rash or pick at your blisters. To help avoid scratching:  ? Keep your fingernails clean and cut short.  ? Wear gloves or mittens while you sleep, if scratching is a problem.  General instructions  · Rest as told by your health care provider.  · Keep all follow-up visits as told by your health care provider. This  is important.  · Wash your hands often with soap and water. If soap and water are not available, use hand . Doing this lowers your chance of getting a bacterial skin infection.  · Before your blisters change into scabs, your shingles infection can cause chickenpox in people who have never had it or have never been vaccinated against it. To prevent this from happening, avoid contact with other people, especially:  ? Babies.  ? Pregnant women.  ? Children who have eczema.  ? Elderly people who have transplants.  ? People who have chronic illnesses, such as cancer or AIDS.  Contact a health care provider if:  · Your pain is not relieved with prescribed medicines.  · Your pain does not get better after the rash heals.  · You have signs of infection in the rash area, such as:  ? More redness, swelling, or pain around the rash.  ? Fluid or blood coming from the rash.  ? The rash area feeling warm to the touch.  ? Pus or a bad smell coming from the rash.  Get help right away if:  · The rash is on your face or nose.  · You have facial pain, pain around your eye area, or loss of feeling on one side of your face.  · You have difficulty seeing.  · You have ear pain or have ringing in your ear.  · You have a loss of taste.  · Your condition gets worse.  Summary  · Shingles, which is also known as herpes zoster, is an infection that causes a painful skin rash and fluid-filled blisters.  · This condition is diagnosed with a skin exam. Skin or fluid samples may be taken from the blisters and examined before the diagnosis is made.  · Keep your rash covered with a loose bandage (dressing). Wear loose-fitting clothing to help ease the pain of material rubbing against the rash.  · Before your blisters change into scabs, your shingles infection can cause chickenpox in people who have never had it or have never been vaccinated against it.  This information is not intended to replace advice given to you by your health care  provider. Make sure you discuss any questions you have with your health care provider.  Document Released: 12/18/2006 Document Revised: 08/22/2018 Document Reviewed: 08/22/2018  [employer] Interactive Patient Education © 2019 [employer]    Neuropathic Pain  Neuropathic pain is pain caused by damage to the nerves that are responsible for certain sensations in your body (sensory nerves). The pain can be caused by:  · Damage to the sensory nerves that send signals to your spinal cord and brain (peripheral nervous system).  · Damage to the sensory nerves in your brain or spinal cord (central nervous system).  Neuropathic pain can make you more sensitive to pain. Even a minor sensation can feel very painful. This is usually a long-term condition that can be difficult to treat. The type of pain differs from person to person. It may:  · Start suddenly (acute), or it may develop slowly and last for a long time (chronic).  · Come and go as damaged nerves heal, or it may stay at the same level for years.  · Cause emotional distress, loss of sleep, and a lower quality of life.  What are the causes?  The most common cause of this condition is diabetes. Many other diseases and conditions can also cause neuropathic pain. Causes of neuropathic pain can be classified as:  · Toxic. This is caused by medicines and chemicals. The most common cause of toxic neuropathic pain is damage from cancer treatments (chemotherapy).  · Metabolic. This can be caused by:  ? Diabetes. This is the most common disease that damages the nerves.  ? Lack of vitamin B from long-term alcohol abuse.  · Traumatic. Any injury that cuts, crushes, or stretches a nerve can cause damage and pain. A common example is feeling pain after losing an arm or leg (phantom limb pain).  · Compression-related. If a sensory nerve gets trapped or compressed for a long period of time, the blood supply to the nerve can be cut off.  · Vascular. Many blood vessel diseases can  cause neuropathic pain by decreasing blood supply and oxygen to nerves.  · Autoimmune. This type of pain results from diseases in which the body's defense system (immune system) mistakenly attacks sensory nerves. Examples of autoimmune diseases that can cause neuropathic pain include lupus and multiple sclerosis.  · Infectious. Many types of viral infections can damage sensory nerves and cause pain. Shingles infection is a common cause of this type of pain.  · Inherited. Neuropathic pain can be a symptom of many diseases that are passed down through families (genetic).  What increases the risk?  You are more likely to develop this condition if:  · You have diabetes.  · You smoke.  · You drink too much alcohol.  · You are taking certain medicines, including medicines that kill cancer cells (chemotherapy) or that treat immune system disorders.  What are the signs or symptoms?  The main symptom is pain. Neuropathic pain is often described as:  · Burning.  · Shock-like.  · Stinging.  · Hot or cold.  · Itching.  How is this diagnosed?  No single test can diagnose neuropathic pain. It is diagnosed based on:  · Physical exam and your symptoms. Your health care provider will ask you about your pain. You may be asked to use a pain scale to describe how bad your pain is.  · Tests. These may be done to see if you have a high sensitivity to pain and to help find the cause and location of any sensory nerve damage. They include:  ? Nerve conduction studies to test how well nerve signals travel through your sensory nerves (electrodiagnostic testing).  ? Stimulating your sensory nerves through electrodes on your skin and measuring the response in your spinal cord and brain (somatosensory evoked potential).  · Imaging studies, such as:  ? X-rays.  ? CT scan.  ? MRI.  How is this treated?  Treatment for neuropathic pain may change over time. You may need to try different treatment options or a combination of treatments. Some options  include:  · Treating the underlying cause of the neuropathy, such as diabetes, kidney disease, or vitamin deficiencies.  · Stopping medicines that can cause neuropathy, such as chemotherapy.  · Medicine to relieve pain. Medicines may include:  ? Prescription or over-the-counter pain medicine.  ? Anti-seizure medicine.  ? Antidepressant medicines.  ? Pain-relieving patches that are applied to painful areas of skin.  ? A medicine to numb the area (local anesthetic), which can be injected as a nerve block.  · Transcutaneous nerve stimulation. This uses electrical currents to block painful nerve signals. The treatment is painless.  · Alternative treatments, such as:  ? Acupuncture.  ? Meditation.  ? Massage.  ? Physical therapy.  ? Pain management programs.  ? Counseling.  Follow these instructions at home:  Medicines    · Take over-the-counter and prescription medicines only as told by your health care provider.  · Do not drive or use heavy machinery while taking prescription pain medicine.  · If you are taking prescription pain medicine, take actions to prevent or treat constipation. Your health care provider may recommend that you:  ? Drink enough fluid to keep your urine pale yellow.  ? Eat foods that are high in fiber, such as fresh fruits and vegetables, whole grains, and beans.  ? Limit foods that are high in fat and processed sugars, such as fried or sweet foods.  ? Take an over-the-counter or prescription medicine for constipation.  Lifestyle    · Have a good support system at home.  · Consider joining a chronic pain support group.  · Do not use any products that contain nicotine or tobacco, such as cigarettes and e-cigarettes. If you need help quitting, ask your health care provider.  · Do not drink alcohol.  General instructions  · Learn as much as you can about your condition.  · Work closely with all your health care providers to find the treatment plan that works best for you.  · Ask your health care  provider what activities are safe for you.  · Keep all follow-up visits as told by your health care provider. This is important.  Contact a health care provider if:  · Your pain treatments are not working.  · You are having side effects from your medicines.  · You are struggling with tiredness (fatigue), mood changes, depression, or anxiety.  Summary  · Neuropathic pain is pain caused by damage to the nerves that are responsible for certain sensations in your body (sensory nerves).  · Neuropathic pain may come and go as damaged nerves heal, or it may stay at the same level for years.  · Neuropathic pain is usually a long-term condition that can be difficult to treat. Consider joining a chronic pain support group.  This information is not intended to replace advice given to you by your health care provider. Make sure you discuss any questions you have with your health care provider.  Document Released: 09/14/2005 Document Revised: 01/04/2019 Document Reviewed: 01/04/2019  [employer] Interactive Patient Education © 2019 [employer]  Valacyclovir caplets  What is this medicine?  VALACYCLOVIR (yady ay SHANE elisabet veer) is an antiviral medicine. It is used to treat or prevent infections caused by certain kinds of viruses. Examples of these infections include herpes and shingles. This medicine will not cure herpes.  This medicine may be used for other purposes; ask your health care provider or pharmacist if you have questions.  COMMON BRAND NAME(S): Valtrex  What should I tell my health care provider before I take this medicine?  They need to know if you have any of these conditions:  -acquired immunodeficiency syndrome (AIDS)  -any other condition that may weaken the immune system  -bone marrow or kidney transplant  -kidney disease  -an unusual or allergic reaction to valacyclovir, acyclovir, ganciclovir, valganciclovir, other medicines, foods, dyes, or preservatives  -pregnant or trying to get pregnant  -breast-feeding  How  should I use this medicine?  Take this medicine by mouth with a glass of water. Follow the directions on the prescription label. You can take this medicine with or without food. Take your doses at regular intervals. Do not take your medicine more often than directed. Finish the full course prescribed by your doctor or health care professional even if you think your condition is better. Do not stop taking except on the advice of your doctor or health care professional.  Talk to your pediatrician regarding the use of this medicine in children. While this drug may be prescribed for children as young as 2 years for selected conditions, precautions do apply.  Overdosage: If you think you have taken too much of this medicine contact a poison control center or emergency room at once.  NOTE: This medicine is only for you. Do not share this medicine with others.  What if I miss a dose?  If you miss a dose, take it as soon as you can. If it is almost time for your next dose, take only that dose. Do not take double or extra doses.  What may interact with this medicine?  -cimetidine  -probenecid  This list may not describe all possible interactions. Give your health care provider a list of all the medicines, herbs, non-prescription drugs, or dietary supplements you use. Also tell them if you smoke, drink alcohol, or use illegal drugs. Some items may interact with your medicine.  What should I watch for while using this medicine?  Tell your doctor or health care professional if your symptoms do not start to get better after 1 week.  This medicine works best when taken early in the course of an infection, within the first 72 hours. Begin treatment as soon as possible after the first signs of infection like tingling, itching, or pain in the affected area.  It is possible that genital herpes may still be spread even when you are not having symptoms. Always use safer sex practices like condoms made of latex or polyurethane whenever  you have sexual contact.  You should stay well hydrated while taking this medicine. Drink plenty of fluids.  What side effects may I notice from receiving this medicine?  Side effects that you should report to your doctor or health care professional as soon as possible:  -allergic reactions like skin rash, itching or hives, swelling of the face, lips, or tongue  -aggressive behavior  -confusion  -hallucinations  -problems with balance, talking, walking  -stomach pain  -tremor  -trouble passing urine or change in the amount of urine  Side effects that usually do not require medical attention (report to your doctor or health care professional if they continue or are bothersome):  -dizziness  -headache  -nausea, vomiting  This list may not describe all possible side effects. Call your doctor for medical advice about side effects. You may report side effects to FDA at 4-074-FDA-4822.  Where should I keep my medicine?  Keep out of the reach of children.  Store at room temperature between 15 and 25 degrees C (59 and 77 degrees F). Keep container tightly closed. Throw away any unused medicine after the expiration date.  NOTE: This sheet is a summary. It may not cover all possible information. If you have questions about this medicine, talk to your doctor, pharmacist, or health care provider.  © 2019 [employer] (2013-12-03 16:34:05)  Dexamethasone injection  What is this medicine?  DEXAMETHASONE (dex a METH a sone) is a corticosteroid. It is used to treat inflammation of the skin, joints, lungs, and other organs. Common conditions treated include asthma, allergies, and arthritis. It is also used for other conditions, like blood disorders and diseases of the adrenal glands.  This medicine may be used for other purposes; ask your health care provider or pharmacist if you have questions.  COMMON BRAND NAME(S): Decadron, DoubleDex, Simplist Dexamethasone, Solurex  What should I tell my health care provider before I  take this medicine?  They need to know if you have any of these conditions:  -blood clotting problems  -Cushing's syndrome  -diabetes  -glaucoma  -heart problems or disease  -high blood pressure  -infection like herpes, measles, tuberculosis, or chickenpox  -kidney disease  -liver disease  -mental problems  -myasthenia gravis  -osteoporosis  -previous heart attack  -seizures  -stomach, ulcer or intestine disease including colitis and diverticulitis  -thyroid problem  -an unusual or allergic reaction to dexamethasone, corticosteroids, other medicines, lactose, foods, dyes, or preservatives  -pregnant or trying to get pregnant  -breast-feeding  How should I use this medicine?  This medicine is for injection into a muscle, joint, lesion, soft tissue, or vein. It is given by a health care professional in a hospital or clinic setting.  Talk to your pediatrician regarding the use of this medicine in children. Special care may be needed.  Overdosage: If you think you have taken too much of this medicine contact a poison control center or emergency room at once.  NOTE: This medicine is only for you. Do not share this medicine with others.  What if I miss a dose?  This may not apply. If you are having a series of injections over a prolonged period, try not to miss an appointment. Call your doctor or health care professional to reschedule if you are unable to keep an appointment.  What may interact with this medicine?  Do not take this medicine with any of the following medications:  -mifepristone, RU-486  -vaccines  This medicine may also interact with the following medications:  -amphotericin B  -antibiotics like clarithromycin, erythromycin, and troleandomycin  -aspirin and aspirin-like drugs  -barbiturates like phenobarbital  -carbamazepine  -cholestyramine  -cholinesterase inhibitors like donepezil, galantamine, rivastigmine, and tacrine  -cyclosporine  -digoxin  -diuretics  -ephedrine  -female hormones, like estrogens  or progestins and birth control pills  -indinavir  -isoniazid  -ketoconazole  -medicines for diabetes  -medicines that improve muscle tone or strength for conditions like myasthenia gravis  -NSAIDs, medicines for pain and inflammation, like ibuprofen or naproxen  -phenytoin  -rifampin  -thalidomide  -warfarin  This list may not describe all possible interactions. Give your health care provider a list of all the medicines, herbs, non-prescription drugs, or dietary supplements you use. Also tell them if you smoke, drink alcohol, or use illegal drugs. Some items may interact with your medicine.  What should I watch for while using this medicine?  Your condition will be monitored carefully while you are receiving this medicine.  If you are taking this medicine for a long time, carry an identification card with your name and address, the type and dose of your medicine, and your doctor's name and address.  This medicine may increase your risk of getting an infection. Stay away from people who are sick. Tell your doctor or health care professional if you are around anyone with measles or chickenpox. Talk to your health care provider before you get any vaccines that you take this medicine.  If you are going to have surgery, tell your doctor or health care professional that you have taken this medicine within the last twelve months.  Ask your doctor or health care professional about your diet. You may need to lower the amount of salt you eat.  The medicine can increase your blood sugar. If you are a diabetic check with your doctor if you need help adjusting the dose of your diabetic medicine.  What side effects may I notice from receiving this medicine?  Side effects that you should report to your doctor or health care professional as soon as possible:  -allergic reactions like skin rash, itching or hives, swelling of the face, lips, or tongue  -black or tarry stools  -change in the amount of urine  -changes in  vision  -confusion, excitement, restlessness, a false sense of well-being  -fever, sore throat, sneezing, cough, or other signs of infection, wounds that will not heal  -hallucinations  -increased thirst  -mental depression, mood swings, mistaken feelings of self importance or of being mistreated  -pain in hips, back, ribs, arms, shoulders, or legs  -pain, redness, or irritation at the injection site  -redness, blistering, peeling or loosening of the skin, including inside the mouth  -rounding out of face  -swelling of feet or lower legs  -unusual bleeding or bruising  -unusual tired or weak  -wounds that do not heal  Side effects that usually do not require medical attention (report to your doctor or health care professional if they continue or are bothersome):  -diarrhea or constipation  -change in taste  -headache  -nausea, vomiting  -skin problems, acne, thin and shiny skin  -touble sleeping  -unusual growth of hair on the face or body  -weight gain  This list may not describe all possible side effects. Call your doctor for medical advice about side effects. You may report side effects to FDA at 0-081-FDA-3454.  Where should I keep my medicine?  This drug is given in a hospital or clinic and will not be stored at home.  NOTE: This sheet is a summary. It may not cover all possible information. If you have questions about this medicine, talk to your doctor, pharmacist, or health care provider.  © 2019 [employer] (2009-04-09 14:04:12)

## 2020-01-27 PROBLEM — I25.2 MYOCARDIAL INFARCT, OLD: Status: RESOLVED | Noted: 2018-10-04 | Resolved: 2020-01-27

## 2020-01-27 NOTE — PROGRESS NOTES
Subjective   Anselmo Calderón is a 59 y.o. male.     Mr. Calderón presents today with complaint of skin along left flank and abdomen feels very painful. He denies rash, but is concerned that he may have shingles. He reports that he had a shingles vaccine 2 weeks ago.   Patient is also requesting refills on some of his routine medications.    Flank Pain   This is a new problem. Episode onset: Patient states that he began having some sensitivity in the left flank area 2 weeks ago. Rapidly worsening past 2 days. The problem occurs daily. Pain location: left flank. The quality of the pain is described as burning. Radiates to: Left upper quadrant. The pain is at a severity of 10/10. The pain is severe. The pain is the same all the time. Exacerbated by: anything that touches his skin. Pertinent negatives include no abdominal pain, bladder incontinence, bowel incontinence, chest pain, dysuria, fever, headaches, leg pain, numbness, paresis, paresthesias, pelvic pain, perianal numbness, tingling, weakness or weight loss. He has tried NSAIDs for the symptoms. The treatment provided no relief.       The following portions of the patient's history were reviewed and updated as appropriate: allergies, current medications, past family history, past medical history, past social history, past surgical history and problem list.    Allergies as of 01/25/2020   • (No Known Allergies)       Current Outpatient Medications on File Prior to Visit   Medication Sig   • aspirin 81 MG EC tablet Take 81 mg by mouth Daily.   • escitalopram (LEXAPRO) 20 MG tablet Take 1 tablet by mouth Daily. NO FURTHER REFILLS UNTIL SEEN BY PROVIDER (Patient taking differently: Take 30 mg by mouth Daily. NO FURTHER REFILLS UNTIL SEEN BY PROVIDER)   • Multiple Vitamins-Minerals (MULTIVITAMIN ADULT PO) Take  by mouth.   • tadalafil (CIALIS) 5 MG tablet Take 5 mg by mouth Daily As Needed for erectile dysfunction.   • diclofenac (VOLTAREN) 1 % gel gel Apply 4 g  topically to the appropriate area as directed 3 (Three) Times a Day.     No current facility-administered medications on file prior to visit.        Review of Systems   Constitutional: Negative.  Negative for fever and weight loss.   HENT: Negative.    Respiratory: Negative.    Cardiovascular: Negative.  Negative for chest pain.   Gastrointestinal: Negative.  Negative for abdominal pain and bowel incontinence.   Genitourinary: Positive for flank pain. Negative for bladder incontinence, decreased urine volume, difficulty urinating, dysuria, frequency, hematuria, pelvic pain and urgency.   Skin:        Severe pain left flank area radiating to left upper rib area. Describes pain as a severe burning sensation. States that it is even painful for his shirt to touch his skin in this area.    Neurological: Negative.  Negative for tingling, weakness, numbness, headaches and paresthesias.       Objective   Physical Exam   Constitutional: He is oriented to person, place, and time. Vital signs are normal. He appears well-developed and well-nourished. He is cooperative. He does not appear ill. No distress.   HENT:   Right Ear: Tympanic membrane normal.   Left Ear: Tympanic membrane normal.   Mouth/Throat: Uvula is midline and mucous membranes are normal.   Cardiovascular: Normal rate, regular rhythm and normal heart sounds.   Pulmonary/Chest: Effort normal and breath sounds normal.   Abdominal: Soft. Normal appearance. He exhibits no distension. There is no tenderness. There is no rigidity, no rebound and no guarding.   Neurological: He is alert and oriented to person, place, and time.   Skin: Skin is warm, dry and intact. No rash noted. He is not diaphoretic.   Patient is exquisitely tender to even light palpation of skin along dermatomal distribution T9, T10. No visible rash.    Psychiatric: He has a normal mood and affect. His behavior is normal. Judgment and thought content normal.   Vitals reviewed.    Vitals:    01/25/20  1529   BP: 136/88   Pulse: 91   Resp: 16   Temp: 97 °F (36.1 °C)   SpO2: 95%     Body mass index is 29.94 kg/m².    Assessment/Plan   Anselmo was seen today for walk in.    Diagnoses and all orders for this visit:    Herpes zoster without complication  -     valACYclovir (VALTREX) 1000 MG tablet; Take 1 tablet by mouth 2 (Two) Times a Day for 10 days.  -     lidocaine (LIDODERM) 5 %; Place 1 patch on the skin as directed by provider Daily. Remove & Discard patch within 12 hours or as directed by MD  -     dexamethasone (DECADRON) injection 4 mg    Mild shortness of breath  -     albuterol sulfate  (90 Base) MCG/ACT inhaler; Inhale 2 puffs Every 4 (Four) Hours As Needed for Wheezing or Shortness of Air.    Type 2 diabetes mellitus with complication, without long-term current use of insulin (CMS/[facility])  -     SITagliptin (JANUVIA) 100 MG tablet; Take 1 tablet by mouth Daily.  -     metFORMIN (GLUCOPHAGE) 850 MG tablet; Take 1 tablet by mouth 2 (Two) Times a Day With Meals.    Medication refill  -     albuterol sulfate  (90 Base) MCG/ACT inhaler; Inhale 2 puffs Every 4 (Four) Hours As Needed for Wheezing or Shortness of Air.  -     atorvastatin (LIPITOR) 40 MG tablet; Take 1 tablet by mouth Daily.  -     SITagliptin (JANUVIA) 100 MG tablet; Take 1 tablet by mouth Daily.  -     metFORMIN (GLUCOPHAGE) 850 MG tablet; Take 1 tablet by mouth 2 (Two) Times a Day With Meals.       Discussed the nature of the medical condition(s) risks, complications, implications, management, safe and proper use of medications. Encouraged medication compliance, and keeping scheduled follow up appointments with me and any other providers.     RTC if symptoms fail to improve, to ER if symptoms worsen.

## 2020-05-06 ENCOUNTER — OFFICE VISIT (OUTPATIENT)
Dept: FAMILY MEDICINE CLINIC | Facility: CLINIC | Age: 60
End: 2020-05-06

## 2020-05-06 VITALS
DIASTOLIC BLOOD PRESSURE: 68 MMHG | OXYGEN SATURATION: 98 % | SYSTOLIC BLOOD PRESSURE: 128 MMHG | WEIGHT: 239.8 LBS | RESPIRATION RATE: 20 BRPM | HEIGHT: 74 IN | BODY MASS INDEX: 30.77 KG/M2 | TEMPERATURE: 97.3 F | HEART RATE: 79 BPM

## 2020-05-06 DIAGNOSIS — Z48.02 VISIT FOR SUTURE REMOVAL: Primary | ICD-10-CM

## 2020-05-06 PROCEDURE — 99213 OFFICE O/P EST LOW 20 MIN: CPT | Performed by: FAMILY MEDICINE

## 2020-05-06 NOTE — PROGRESS NOTES
"Subjective   Anselmo Calderón is a 60 y.o. male seen today for Suture / Staple Removal (left hand. pt experiencing pins and needle sensation in affected hand).     Suture / Staple Removal   The sutures were placed 7 to 10 days ago. He tried regular soap and water washings and antibiotic ointment use since the wound repair. The treatment provided moderate relief. The maximum temperature noted was less than 100.4 F. The temperature was taken using a tympanic thermometer. There has been bloody discharge from the wound. The redness has not changed. The pain has improved. He has no difficulty moving the affected extremity or digit.          The following portions of the patient's history were reviewed and updated as appropriate: allergies, current medications, past social history and problem list.    Review of Systems   Constitutional: Negative for chills and fever.   Respiratory: Negative for shortness of breath.    Cardiovascular: Negative for chest pain.   Skin: Positive for wound (healing laceration of the left index finger..).       Objective   /68 (BP Location: Right arm, Patient Position: Sitting, Cuff Size: Adult)   Pulse 79   Temp 97.3 °F (36.3 °C) (Temporal)   Resp 20   Ht 188 cm (74\")   Wt 109 kg (239 lb 12.8 oz)   SpO2 98%   BMI 30.79 kg/m²   Physical Exam   Constitutional: He appears well-developed and well-nourished.   Skin:   Examination of the fingertip of the left second finger reveals the presence of 3 sutures that are located in a healing laceration that is semicircumferential involving the horizontal aspect of the fingertip.  The skin of the fingertip is very minimally darkened compared to the rest of the finger.  There is blanching when pressure is applied.  There is no purulent drainage.  There is some clear drainage.  There is 1 small island about to 2 mm in diameter that appears to be developing scar tissue.  The fingernail itself is normal.   Nursing note and vitals " reviewed.        Assessment/Plan   Problem List Items Addressed This Visit     None      Visit Diagnoses     Visit for suture removal    -  Primary    Relevant Orders    Suture Removal        Suture Removal  Date/Time: 5/6/2020 10:53 AM  Performed by: Galindo Brooks MD  Authorized by: Galindo Brooks MD   Consent: Verbal consent obtained.  Consent given by: patient  Patient understanding: patient states understanding of the procedure being performed  Patient consent: the patient's understanding of the procedure matches consent given  Body area: upper extremity  Location details: left index finger  Wound Appearance: clean, pink and warm  Sutures Removed: 3      The edges of the wound are well approximated.  There appears to be good stability of the tissue.  The procedure was well-tolerated.    A dressing of non-adhesive dressing was applied with a Bactroban dressing as well.  The dressing was secured with a finger splint that is prefabricated.  This was secured with tape.  We will have the patient change his dressing on a daily basis for the next week.  He is to return should he develop further discoloration of the fingertip.        Continue dressing changes as doing.    Follow up as needed.          Scribed for Dr Galindo Brooks by Christianne Garcia CMA.          I, Galindo Brooks MD, personally performed the services described in this documentation, as scribed by Christianne Garcia in my presence, and is both accurate and complete.        (Please note that portions of this note were completed with a voice recognition program. Efforts were made to edit the dictations,but occasionally words are mis transcribed.)

## 2020-08-10 ENCOUNTER — HOSPITAL ENCOUNTER (OUTPATIENT)
Dept: GENERAL RADIOLOGY | Facility: HOSPITAL | Age: 60
Discharge: HOME | End: 2020-08-10
Admitting: FAMILY MEDICINE

## 2020-08-10 ENCOUNTER — OFFICE VISIT (OUTPATIENT)
Dept: FAMILY MEDICINE CLINIC | Facility: CLINIC | Age: 60
End: 2020-08-10

## 2020-08-10 VITALS
WEIGHT: 230 LBS | HEIGHT: 74 IN | OXYGEN SATURATION: 99 % | SYSTOLIC BLOOD PRESSURE: 124 MMHG | RESPIRATION RATE: 16 BRPM | DIASTOLIC BLOOD PRESSURE: 78 MMHG | BODY MASS INDEX: 29.52 KG/M2 | HEART RATE: 81 BPM

## 2020-08-10 DIAGNOSIS — S67.195A CRUSHING INJURY OF LEFT RING FINGER, INITIAL ENCOUNTER: Primary | ICD-10-CM

## 2020-08-10 PROBLEM — H91.90 HEARING LOSS: Status: ACTIVE | Noted: 2020-08-10

## 2020-08-10 PROBLEM — F43.10 POSTTRAUMATIC STRESS DISORDER: Status: ACTIVE | Noted: 2020-08-10

## 2020-08-10 PROCEDURE — 99213 OFFICE O/P EST LOW 20 MIN: CPT | Performed by: FAMILY MEDICINE

## 2020-08-10 PROCEDURE — 73140 X-RAY EXAM OF FINGER(S): CPT

## 2020-08-10 RX ORDER — IBUPROFEN 800 MG/1
800 TABLET ORAL EVERY 8 HOURS PRN
Qty: 45 TABLET | Refills: 0 | Status: SHIPPED | OUTPATIENT
Start: 2020-08-10

## 2020-08-10 NOTE — PROGRESS NOTES
Subjective   Anselmo Calderón is a 60 y.o. male.     History of Present Illness   Smashed left 4th finger 2 weeks ago while lifting a heavy board. Skin was broken. Tdap UTD. Unable to completely bend tip of finger. Has increased swelling and pain. Has not taken OTC med.     The following portions of the patient's history were reviewed and updated as appropriate: allergies, current medications, past family history, past medical history, past social history, past surgical history and problem list.  Vitals:    08/10/20 0926   BP: 124/78   Pulse: 81   Resp: 16   SpO2: 99%     Body mass index is 29.53 kg/m².  Review of Systems   Constitutional: Negative.  Negative for activity change, fatigue, fever, unexpected weight gain and unexpected weight loss.   HENT: Negative.  Negative for congestion, sneezing and sore throat.    Eyes: Negative.  Negative for blurred vision, double vision and visual disturbance.   Respiratory: Negative.  Negative for cough, chest tightness, shortness of breath and wheezing.    Cardiovascular: Negative.  Negative for chest pain, palpitations and leg swelling.   Gastrointestinal: Negative.  Negative for abdominal distention, abdominal pain, blood in stool, constipation, diarrhea and nausea.   Endocrine: Negative.  Negative for cold intolerance and heat intolerance.   Genitourinary: Negative.  Negative for dysuria, frequency, urgency and urinary incontinence.   Musculoskeletal: Negative.  Negative for arthralgias and myalgias.   Skin: Negative.  Negative for rash.   Allergic/Immunologic: Negative.    Neurological: Negative.  Negative for dizziness, syncope, numbness and memory problem.   Hematological: Negative.  Negative for adenopathy.   Psychiatric/Behavioral: Negative.  Negative for suicidal ideas and depressed mood. The patient is not nervous/anxious.    All other systems reviewed and are negative.      Objective   Physical Exam   Constitutional: He is oriented to person, place, and time. He  appears well-developed and well-nourished. No distress.   HENT:   Right Ear: External ear normal.   Left Ear: External ear normal.   Nose: Nose normal.   Mouth/Throat: Oropharynx is clear and moist.   Eyes: Pupils are equal, round, and reactive to light. Conjunctivae and EOM are normal.   Neck: Normal range of motion. Neck supple. No thyromegaly present.   Cardiovascular: Normal rate, regular rhythm and normal heart sounds.   No murmur heard.  Pulmonary/Chest: Effort normal and breath sounds normal. No respiratory distress. He has no wheezes.   Abdominal: Soft. Bowel sounds are normal. He exhibits no distension and no mass. There is no tenderness. There is no rebound and no guarding. No hernia.   Musculoskeletal: Normal range of motion. He exhibits no edema or tenderness.   Left fourth finger swollen at DIP, unable to flex at DIP   Lymphadenopathy:     He has no cervical adenopathy.   Neurological: He is alert and oriented to person, place, and time. He has normal reflexes.   Skin: Skin is warm and dry. No rash noted. He is not diaphoretic. No erythema. No pallor.   Psychiatric: He has a normal mood and affect. His behavior is normal. Judgment and thought content normal.   Nursing note and vitals reviewed.          Assessment/Plan   Anselmo was seen today for hand pain.    Diagnoses and all orders for this visit:    Crushing injury of left ring finger, initial encounter  -     Ambulatory Referral to Hand Surgery  -     XR Finger 2+ View Left (In Office)    Other orders  -     ibuprofen (ADVIL,MOTRIN) 800 MG tablet; Take 1 tablet by mouth Every 8 (Eight) Hours As Needed for Mild Pain .

## 2020-09-22 ENCOUNTER — TELEPHONE (OUTPATIENT)
Dept: FAMILY MEDICINE CLINIC | Facility: CLINIC | Age: 60
End: 2020-09-22

## 2020-09-22 NOTE — TELEPHONE ENCOUNTER
----- Message from Chayito Waldron DO sent at 9/21/2020 10:25 AM EDT -----  Please call and see who this patient's primary care is He is due for follow-up and blood work, especially to check for diabetes